# Patient Record
Sex: MALE | Race: WHITE | NOT HISPANIC OR LATINO | Employment: OTHER | URBAN - METROPOLITAN AREA
[De-identification: names, ages, dates, MRNs, and addresses within clinical notes are randomized per-mention and may not be internally consistent; named-entity substitution may affect disease eponyms.]

---

## 2017-01-03 ENCOUNTER — RECORDS - HEALTHEAST (OUTPATIENT)
Dept: LAB | Facility: CLINIC | Age: 67
End: 2017-01-03

## 2017-01-03 LAB
CHOLEST SERPL-MCNC: 132 MG/DL
FASTING STATUS PATIENT QL REPORTED: NO
HDLC SERPL-MCNC: 54 MG/DL
LDLC SERPL CALC-MCNC: 69 MG/DL
TRIGL SERPL-MCNC: 43 MG/DL

## 2017-01-04 LAB — HCV AB SERPL QL IA: NEGATIVE

## 2017-05-03 ENCOUNTER — OFFICE VISIT (OUTPATIENT)
Dept: DERMATOLOGY | Facility: CLINIC | Age: 67
End: 2017-05-03

## 2017-05-03 DIAGNOSIS — L82.1 SK (SEBORRHEIC KERATOSIS): ICD-10-CM

## 2017-05-03 DIAGNOSIS — L81.4 SOLAR LENTIGO: ICD-10-CM

## 2017-05-03 DIAGNOSIS — Z85.828 HISTORY OF NONMELANOMA SKIN CANCER: ICD-10-CM

## 2017-05-03 DIAGNOSIS — D18.01 CHERRY ANGIOMA: ICD-10-CM

## 2017-05-03 DIAGNOSIS — L21.9 DERMATITIS, SEBORRHEIC: Primary | ICD-10-CM

## 2017-05-03 DIAGNOSIS — Z12.83 SCREENING EXAM FOR SKIN CANCER: ICD-10-CM

## 2017-05-03 RX ORDER — MULTIVITAMIN
1 CAPSULE ORAL
COMMUNITY
Start: 2010-11-23

## 2017-05-03 RX ORDER — ATORVASTATIN CALCIUM 20 MG/1
20 TABLET, FILM COATED ORAL
COMMUNITY
Start: 2016-10-10 | End: 2019-10-01

## 2017-05-03 RX ORDER — CLOPIDOGREL BISULFATE 75 MG/1
75 TABLET ORAL
COMMUNITY
Start: 2016-12-19 | End: 2018-02-02

## 2017-05-03 RX ORDER — HYDROCORTISONE 25 MG/G
OINTMENT TOPICAL 2 TIMES DAILY
Qty: 30 G | Refills: 1 | Status: SHIPPED | OUTPATIENT
Start: 2017-05-03 | End: 2018-02-02

## 2017-05-03 RX ORDER — HYDROCHLOROTHIAZIDE 25 MG/1
25 TABLET ORAL
COMMUNITY
Start: 2017-01-19 | End: 2023-07-25

## 2017-05-03 ASSESSMENT — PAIN SCALES - GENERAL: PAINLEVEL: NO PAIN (0)

## 2017-05-03 NOTE — NURSING NOTE
Dermatology Rooming Note    Eliceo Griggs's goals for this visit include:   Chief Complaint   Patient presents with     Derm Problem     Eliceo comes to clinic today for a skin exam. States concern with a new lesion on his left forearm x 1.5 weeks as well as a lesion on his scalp that bleeds.     Eleni Gifford, CMA

## 2017-05-03 NOTE — PROGRESS NOTES
[facility] Dermatology Note    Dermatology Problem List:  1.NMSC   - BCC 02/2016 x3 from right chest, right neck, and mid back s/p ED&C   - BCC 06/2014, right cheek s/p Mohs  2. Hypertrophic scar at prior ED&C site of superficial BCC from mid back s/p biopsy 4/14/16  3. Seborrheic dermatitis  - hydrocortisone 2.5% ointment    Encounter Date: May 3, 2017    CC:   Chief Complaint   Patient presents with     Derm Problem     Eliceo comes to clinic today for a skin exam. States concern with a new lesion on his left forearm x 1.5 weeks as well as a lesion on his scalp that bleeds.     History of Present Illness:  Mr. Eliceo Griggs is a 67 year old male with history of NMSC who presents for a skin check. Today the patient reports that he has two spots of concern on his left forearm and on his scalp. He states that the spot on his forearm appeared 1.5 weeks ago. It does not hurt, itch, and it started out as a bruise. He notes that he is on blood thinners, so he has been bruising very easily. He reports that the spot on his head it is a little sore and it has bleed in the past. It has not bled in about 6 months ago, especially in the summer. He does bump is head a lot and the spot has not grown in size. His face is sometimes a little dry, he was given clobetasol years ago. The patient reports no other lesions of concern at this time.     Otherwise, the patient reports no painful, bleeding, nonhealing, or pruritic lesions, and denies new or changing moles.    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 He went to school to get his masters and finished 2 years ago.     Family History  Not addressed at this visit.     Medications:  Current Outpatient Prescriptions   Medication Sig Dispense Refill     clopidogrel (PLAVIX) 75 MG tablet Take 75 mg by mouth       atorvastatin (LIPITOR) 20 MG tablet Take 20 mg by mouth       hydrochlorothiazide (HYDRODIURIL) 25 MG tablet Take 25 mg by mouth       Multiple Vitamin (MULTIVITAMIN  S) CAPS Take 1 capsule by mouth       aspirin 81 MG tablet Take 81 mg by mouth       hydrocortisone 2.5 % ointment Apply topically 2 times daily To face and ears 30 g 1        Allergies   Allergen Reactions     Penicillins Anaphylaxis     Erythromycin Rash     Sulfa Drugs Hives and Rash     Review of Systems:  -Skin: As above in HPI. No additional skin concerns.    Physical exam:  GEN: Well-developed, well-nourished  male in no acute distress, pleasant, cooperative with exam    SKIN: Total skin excluding the undergarment areas was performed. The exam included the head/face, neck, both arms, chest, back, abdomen, both legs, digits and/or nails.   - Purpuric macule 5 mm in greatest diameter superimposed upon on a 1 mm seborrheic keratosis  - Right frontal scalp: roughly 2 cm relatively ill defined medium pink to light purple patch that is best discerned with tangential dermascopic light. Skin surface is identical to surrounding skin and is not shinier no in focus telangiectasias. Site is not friable based on rubbing test both with finger and alcohol wipes. Areas of concern are 2 small foci at 12 o'clock pole where small whitish crystalline like structures are noted amidst a light pink background, however no classic features of BCC are present, favor scar.   - Many nevi contain interrupted reticular networks suggestive of partial regression  - Well healed biopsy site scar on the right mid back  -  There are bright red some shaped papules scattered on the chest.   - One junctional nevus on the buttocks  - No other lesions of concern on areas examined.     Impression/Plan:  1. History of NMSC  - No evidence of recurrence    2. Purpuric macule superimposed upon a seborrheic keratosis  - No further intervention required. Patient to report changes.     3. Favor scar - right frontal scalp  - Photo obtained at today's visit.   - Monitor lesion for changes, consider biopsy if lesion changes    4. Multiple clinically benign nevi - trunk, extremities  - No further intervention required. Patient to report changes.     5. Cherry angioma(s) - chest  - No further intervention required. Patient to report changes.     6. Seborrheic dermatitis  - Start hydrocortisone 2.5% ointment - apply to face and ears BID    Follow-up in 1 year, earlier for new or changing lesions.    Staff Involved:  Scribe Disclosure:   I, Saba Belle, am serving as a scribe to document services personally performed by Dr. Elvin Delvalle, based on data collection and the provider's statements to me.     Staff attestation:  The documentation recorded by the scribe accurately reflects the services I personally performed and the decisions I personally made.    Elvin Delvalle MD  Staff Dermatologist    Department of Dermatology

## 2017-05-03 NOTE — MR AVS SNAPSHOT
After Visit Summary   5/3/2017    Eliceo Griggs    MRN: [id number]           Patient Information     Date Of Birth          1950        Visit Information        Provider Department      5/3/2017 12:30 PM Elvin Delvalle MD [facility] Dermatology        Today's Diagnoses     Dermatitis, seborrheic    -  1    History of nonmelanoma skin cancer        SK (seborrheic keratosis)        Cherry angioma        Solar lentigo        Screening exam for skin cancer           Follow-ups after your visit        Who to contact     Please call your clinic at [phone] to:    Ask questions about your health    Make or cancel appointments    Discuss your medicines    Learn about your test results    Speak to your doctor   If you have compliments or concerns about an experience at your clinic, or if you wish to file a complaint, please contact [facility] Patient Relations at [phone] or email us at [email or web address]         Additional Information About Your Visit        MyChart Information     Ion Coret gives you secure access to your electronic health record. If you see a primary care provider, you can also send messages to your care team and make appointments. If you have questions, please call your primary care clinic.  If you do not have a primary care provider, please call [phone] and they will assist you.      [employer] is an electronic gateway that provides easy, online access to your medical records. With [employer], you can request a clinic appointment, read your test results, renew a prescription or communicate with your care team.     To access your existing account, please contact your [facility] or call [phone] for assistance.        Care EveryWhere ID     This is your Care EveryWhere ID. This could be used by other organizations to access your [place] medical records  [id number]         Blood Pressure from Last 3  Encounters:   No data found for BP    Weight from Last 3 Encounters:   No data found for Wt              Today, you had the following     No orders found for display         Today's Medication Changes          These changes are accurate as of: 5/3/17 11:59 PM.  If you have any questions, ask your nurse or doctor.               Start taking these medicines.        Dose/Directions    hydrocortisone 2.5 % ointment   Used for:  Dermatitis, seborrheic   Started by:  Elvin Delvalle MD        Apply topically 2 times daily To face and ears   Quantity:  30 g   Refills:  1         These medicines have changed or have updated prescriptions.        Dose/Directions    aspirin 81 MG tablet   This may have changed:  Another medication with the same name was removed. Continue taking this medication, and follow the directions you see here.   Changed by:  Elvin Delvalle MD        Dose:  81 mg   Take 81 mg by mouth   Refills:  0         Stop taking these medicines if you haven't already. Please contact your care team if you have questions.     atenolol 25 MG tablet   Commonly known as:  TENORMIN   Stopped by:  Elvin Delvalle MD           clobetasol 0.05 % ointment   Commonly known as:  TEMOVATE   Stopped by:  Elvin Delvalle MD           MULTIVITAMIN PO   Stopped by:  Elvin Delvalle MD                Where to get your medicines      These medications were sent to [facility] [address] [place], [place] [address]     Phone:  [phone]     hydrocortisone 2.5 % ointment                Primary Care Provider Office Phone # Fax #    Tolu [place] [phone]       [address] [address]        Thank you!     Thank you for choosing [facility] DERMATOLOG[facility]  for your care. Our goal is always to provide you with excellent care. Hearing back from our patients is one way we can continue to improve our services. Please  take a few minutes to complete the written survey that you may receive in the mail after your visit with us. Thank you!             Your Updated Medication List - Protect others around you: Learn how to safely use, store and throw away your medicines at www.disposemymeds.org.          This list is accurate as of: 5/3/17 11:59 PM.  Always use your most recent med list.                   Brand Name Dispense Instructions for use    aspirin 81 MG tablet      Take 81 mg by mouth       atorvastatin 20 MG tablet    LIPITOR     Take 20 mg by mouth       clopidogrel 75 MG tablet    PLAVIX     Take 75 mg by mouth       hydrochlorothiazide 25 MG tablet    HYDRODIURIL     Take 25 mg by mouth       hydrocortisone 2.5 % ointment     30 g    Apply topically 2 times daily To face and ears       MULTIvitamin  S Caps      Take 1 capsule by mouth

## 2017-05-03 NOTE — LETTER
5/3/2017       RE: Elicoe Griggs  [address] [place] [address]     Dear Colleague,    Thank you for referring your patient, Eliceo Griggs, to the [facility] DERMATOLOG[facility] at [facility]. Please see a copy of my visit note below.    [facility] Dermatology Note    Dermatology Problem List:  1.NMSC   - BCC 02/2016 x3 from right chest, right neck, and mid back s/p ED&C   - BCC 06/2014, right cheek s/p Mohs  2. Hypertrophic scar at prior ED&C site of superficial BCC from mid back s/p biopsy 4/14/16  3. Seborrheic dermatitis  - hydrocortisone 2.5% ointment    Encounter Date: May 3, 2017    CC:   Chief Complaint   Patient presents with     Derm Problem     Eliceo comes to clinic today for a skin exam. States concern with a new lesion on his left forearm x 1.5 weeks as well as a lesion on his scalp that bleeds.     History of Present Illness:  Mr. Eliceo Griggs is a 67 year old male with history of NMSC who presents for a skin check. Today the patient reports that he has two spots of concern on his left forearm and on his scalp. He states that the spot on his forearm appeared 1.5 weeks ago. It does not hurt, itch, and it started out as a bruise. He notes that he is on blood thinners, so he has been bruising very easily. He reports that the spot on his head it is a little sore and it has bleed in the past. It has not bled in about 6 months ago, especially in the summer. He does bump is head a lot and the spot has not grown in size. His face is sometimes a little dry, he was given clobetasol years ago. The patient reports no other lesions of concern at this time.     Otherwise, the patient reports no painful, bleeding, nonhealing, or pruritic lesions, and denies new or changing moles.    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 He went to school to get his masters and finished 2 years ago.     Family History  Not addressed at this visit.     Medications:  Current Outpatient Prescriptions   Medication Sig Dispense Refill     clopidogrel (PLAVIX) 75 MG tablet Take 75 mg by mouth       atorvastatin (LIPITOR) 20 MG tablet Take 20 mg by mouth       hydrochlorothiazide (HYDRODIURIL) 25 MG tablet Take 25 mg by mouth       Multiple Vitamin (MULTIVITAMIN  S) CAPS Take 1 capsule by mouth       aspirin 81 MG tablet Take 81 mg by mouth       hydrocortisone 2.5 % ointment Apply topically 2 times daily To face and ears 30 g 1        Allergies   Allergen Reactions     Penicillins Anaphylaxis     Erythromycin Rash     Sulfa Drugs Hives and Rash     Review of Systems:  -Skin: As above in HPI. No additional skin concerns.    Physical exam:  GEN: Well-developed, well-nourished  male in no acute distress, pleasant, cooperative with exam    SKIN: Total skin excluding the undergarment areas was performed. The exam included the head/face, neck, both arms, chest, back, abdomen, both legs, digits and/or nails.   - Purpuric macule 5 mm in greatest diameter superimposed upon on a 1 mm seborrheic keratosis  - Right frontal scalp: roughly 2 cm relatively ill defined medium pink to light purple patch that is best discerned with tangential dermascopic light. Skin surface is identical to surrounding skin and is not shinier no in focus telangiectasias. Site is not friable based on rubbing test both with finger and alcohol wipes. Areas of concern are 2 small foci at 12 o'clock pole where small  whitish crystalline like structures are noted amidst a light pink background, however no classic features of BCC are present, favor scar.   - Many nevi contain interrupted reticular networks suggestive of partial regression  - Well healed biopsy site scar on the right mid back  - There are bright red some shaped papules scattered on the chest.   - One junctional nevus on the buttocks  - No other lesions of concern on areas examined.     Impression/Plan:  1. History of NMSC  - No evidence of recurrence    2. Purpuric macule superimposed upon a seborrheic keratosis  - No further intervention required. Patient to report changes.     3. Favor scar - right frontal scalp  - Photo obtained at today's visit.   - Monitor lesion for changes, consider biopsy if lesion changes    4. Multiple clinically benign nevi - trunk, extremities  - No further intervention required. Patient to report changes.     5. Cherry angioma(s) - chest  - No further intervention required. Patient to report changes.     6. Seborrheic dermatitis  - Start hydrocortisone 2.5% ointment - apply to face and ears BID       Follow-up in 1 year, earlier for new or changing lesions.    Staff Involved:  Scribe Disclosure:   I, Saba Belle, am serving as a scribe to document services personally performed by Dr. Elvin Delvalle, based on data collection and the provider's statements to me.     Again, thank you for allowing me to participate in the care of your patient.      Sincerely,    Elvin Delvalle MD

## 2017-07-13 ENCOUNTER — RECORDS - HEALTHEAST (OUTPATIENT)
Dept: LAB | Facility: CLINIC | Age: 67
End: 2017-07-13

## 2017-07-13 LAB
CHOLEST SERPL-MCNC: 129 MG/DL
FASTING STATUS PATIENT QL REPORTED: NORMAL
HDLC SERPL-MCNC: 58 MG/DL
LDLC SERPL CALC-MCNC: 63 MG/DL
TRIGL SERPL-MCNC: 42 MG/DL

## 2018-01-17 ENCOUNTER — TRANSFERRED RECORDS (OUTPATIENT)
Dept: HEALTH INFORMATION MANAGEMENT | Facility: CLINIC | Age: 68
End: 2018-01-17

## 2018-01-29 PROCEDURE — 00000346 ZZHCL STATISTIC REVIEW OUTSIDE SLIDES TC 88321: Performed by: UROLOGY

## 2018-01-30 LAB — COPATH REPORT: NORMAL

## 2018-01-31 ENCOUNTER — RECORDS - HEALTHEAST (OUTPATIENT)
Dept: LAB | Facility: CLINIC | Age: 68
End: 2018-01-31

## 2018-01-31 LAB
ANION GAP SERPL CALCULATED.3IONS-SCNC: 10 MMOL/L (ref 5–18)
BUN SERPL-MCNC: 30 MG/DL (ref 8–22)
CALCIUM SERPL-MCNC: 9.4 MG/DL (ref 8.5–10.5)
CHLORIDE BLD-SCNC: 107 MMOL/L (ref 98–107)
CO2 SERPL-SCNC: 25 MMOL/L (ref 22–31)
CREAT SERPL-MCNC: 0.82 MG/DL (ref 0.7–1.3)
GFR SERPL CREATININE-BSD FRML MDRD: >60 ML/MIN/1.73M2
GLUCOSE BLD-MCNC: 103 MG/DL (ref 70–125)
POTASSIUM BLD-SCNC: 3.9 MMOL/L (ref 3.5–5)
SODIUM SERPL-SCNC: 142 MMOL/L (ref 136–145)

## 2018-02-02 ENCOUNTER — OFFICE VISIT (OUTPATIENT)
Dept: UROLOGY | Facility: CLINIC | Age: 68
End: 2018-02-02
Payer: COMMERCIAL

## 2018-02-02 VITALS
HEIGHT: 71 IN | SYSTOLIC BLOOD PRESSURE: 138 MMHG | DIASTOLIC BLOOD PRESSURE: 86 MMHG | WEIGHT: 174 LBS | BODY MASS INDEX: 24.36 KG/M2 | HEART RATE: 70 BPM

## 2018-02-02 DIAGNOSIS — C61 PROSTATE CANCER (H): Primary | ICD-10-CM

## 2018-02-02 PROCEDURE — 99203 OFFICE O/P NEW LOW 30 MIN: CPT | Performed by: UROLOGY

## 2018-02-02 ASSESSMENT — PAIN SCALES - GENERAL: PAINLEVEL: NO PAIN (0)

## 2018-02-02 NOTE — PROGRESS NOTES
Chief Complaint:   Prostate Cancer         History of Present Illness:   Eliceo Griggs is a very pleasant 67 year old male who presents with a history of prostate cancer. Has previously seen Dr. Dobbs. Elevated PSA led to biopsy in 2014 demonstrating [place] 3+3=6 prostate cancer in 1 core. Has had subsequent biopsies in 2015 and 2016 demonstrating stable disease. PSA has now risen to 9.2 and has MRI suspicious for clinically significant disease. Patient's father  of metastatic prostate cancer. Patient reports he is considering prostatectomy.     MRI  2018  CONCLUSION:  1. Right lateral peripheral zone apical lesion is assigned PI-RADS CATEGORY   4: High. Clinically significant cancer is likely to be present.  2. Midline posterior peripheral zone lesion is assigned PI-RADS CATEGORY 4:   High. Clinically significant cancer is likely to be present. Though this   finding could also reflect a pseudolesion, a focus of clinically   significant malignancy cannot be excluded.    FINAL DIAGNOSIS:   I. CASE FROM [place], [place] ([id number], OBTAINED   14):   A. Prostate, right base, biopsy:   - Benign prostate tissue     B. Prostate, right mid, biopsy:   - Benign prostate tissue     C. Prostate, right apex, biopsy:   - Benign prostate tissue     D. Prostate, left base, biopsy:   - Benign prostate tissue     E. Prostate, left mid, biopsy:   - Benign prostate tissue     F. Prostate, left apex, biopsy:   - Prostatic adenocarcinoma   - Maribel score 6 (3+3)   - Grade Group 1   - Extent: Involves one core (2 mm, 15%)     II. CASE FROM [place], [place] ([id number], OBTAINED   6/29/15):   A. Prostate, left mid, biopsy:   - Atypical small acinar proliferation (ASAP)     B. Prostate, left base, biopsy:   - Benign prostate tissue     C. Prostate, left apex, biopsy:   - Benign prostate tissue     D. Prostate, right mid, biopsy:   - Benign prostate tissue     E. Prostate, right  base, biopsy:   - Benign prostate tissue     F. Prostate, right apex, biopsy:   - Benign prostate tissue     III. CASE FROM [place], [place] ([id number], OBTAINED   6/6/16):   A. Prostate, left mid, biopsy:   - Benign prostate tissue     B. Prostate, left base, biopsy:   - Atypical small acinar proliferation (ASAP), suspicious for Maribel score    6 adenocarcinoma (<5% involvement)     C. Prostate, left apex, biopsy:   - Benign prostate tissue     D. Prostate, right mid, biopsy:   - Benign prostate tissue     E. Prostate, right base, biopsy:   - Benign prostate tissue     F. Prostate, right apex, biopsy:   - Benign prostate tissue            Past Medical History:     Past Medical History:   Diagnosis Date     Basal cell carcinoma      Complication of anesthesia     a long time ago during knee surgery. Patient was nauseous for 3 days after surgery     Other specified malignant neoplasm of skin of trunk, except scrotum      Prostate infection     prostatitis     Squamous cell carcinoma    CAD - stent about 18 months ago         Past Surgical History:     Past Surgical History:   Procedure Laterality Date     BIOPSY OF SKIN LESION       LITHOTRIPSY       MOHS MICROGRAPHIC PROCEDURE       PROSTATE BIOPSY, NEEDLE, SATURATION SAMPLING       TESTICLE SURGERY       VASECTOMY            Medications     Current Outpatient Prescriptions   Medication     atorvastatin (LIPITOR) 20 MG tablet     hydrochlorothiazide (HYDRODIURIL) 25 MG tablet     Multiple Vitamin (MULTIVITAMIN  S) CAPS     aspirin 81 MG tablet     No current facility-administered medications for this visit.           Family History:     Family History   Problem Relation Age of Onset     CANCER Mother      skin cancer     Skin Cancer No family hx of      no skin cancer   Father with prostate cancer         Social History:     Social History     Social History     Marital status:      Spouse name: N/A     Number of children: N/A     Years of  "education: N/A     Occupational History     Not on file.     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         Physical Exam:     Patient is a 67 year old  male   Vitals: Height 1.803 m (5' 11\"), weight 78.9 kg (174 lb).  General Appearance Adult: Body mass index is 24.27 kg/(m^2).  Alert, no acute distress, oriented  HENT: throat/mouth:normal, good dentition  Lungs: no respiratory distress, or pursed lip breathing  Heart: No obvious jugular venous distension present  Abdomen: soft, nontender, no organomegaly or masses,   Lymphatics: No inguinal adenopathy  Musculoskeltal: extremities normal, no peripheral edema  Skin: no suspicious lesions or rashes  Neuro: Alert, oriented, speech and mentation normal  Psych: affect and mood normal  Gait: Normal  : penis, scrotum, testes normal   LAURA anodular, symmetric - ~40cc      Labs and Pathology:    I reviewed all applicable laboratory and pathology data and went over findings with patient  Significant for No results found for: CR      Imaging:    I reviewed all applicable imaging and went over findings with patient.  MRI findings described above.      Outside and Past Medical records:    I spent 10 minutes reviewing outside and past medical records.         Assessment and Plan:     Assessment: 67 year old male with history of low-risk prostate cancer on AS for past 3 years with 3 previous low risk biopsies, last in 6/2016. Now with PSA rise to 9.2 and MRI suggestive of PIRADS 4 lesions. Given this current scenario, my recommendation would be MRI-US fusion " biopsy to better characterize the patient's cancer prior to proceeding with surgery given he has not had a biopsy in 18 months and has PSA rise and new MRI lesions. Furthermore, we discussed the implications of proceeding with surgery and potential risks/complications. I described robotic prostatectomy and the risks/benefits of this vs open prostatectomy. If elects to proceed with MRI Fusion biopsy, I would like to review his MRI and have it read by our radiologists with targets made for Uronav biopsy, which we could do in the coming weeks.     Plan:  Patient to consider options. If he would like to proceed with MRI biopsy, will send us MRI and we will contact him to schedule once this has been reviewed by our radiologists.    Lincoln Hurt MD  Urology  [facility]  Clinic Phone [phone]

## 2018-02-02 NOTE — NURSING NOTE
Chief Complaint   Patient presents with     Prostate Cancer     Patient is here for a second opinion on prostate cancer      Abdulaziz Anderson LPN 11:15 AM February 2, 2018

## 2018-02-02 NOTE — MR AVS SNAPSHOT
"              After Visit Summary   2/2/2018    Eliceo Griggs    MRN: [id number]           Patient Information     Date Of Birth          1950        Visit Information        Provider Department      2/2/2018 11:00 AM Lincoln Hurt MD [facility] [place]        Today's Diagnoses     Prostate cancer (H)    -  1       Follow-ups after your visit        Who to contact     If you have questions or need follow up information about today's clinic visit or your schedule please contact [facility] [place] directly at [phone].  Normal or non-critical lab and imaging results will be communicated to you by OM Latamhart, letter or phone within 4 business days after the clinic has received the results. If you do not hear from us within 7 days, please contact the clinic through nPariot or phone. If you have a critical or abnormal lab result, we will notify you by phone as soon as possible.  Submit refill requests through SmartSynch or call your pharmacy and they will forward the refill request to us. Please allow 3 business days for your refill to be completed.          Additional Information About Your Visit        MyChart Information     SmartSynch gives you secure access to your electronic health record. If you see a primary care provider, you can also send messages to your care team and make appointments. If you have questions, please call your primary care clinic.  If you do not have a primary care provider, please call [phone] and they will assist you.        Care EveryWhere ID     This is your Care EveryWhere ID. This could be used by other organizations to access your [place] medical records  [id number]        Your Vitals Were     Pulse Height BMI (Body Mass Index)             70 1.803 m (5' 11\") 24.27 kg/m2          Blood Pressure from Last 3 Encounters:   02/02/18 138/86   07/24/14 (!) 141/94    Weight from Last 3 Encounters: "   02/02/18 78.9 kg (174 lb)              Today, you had the following     No orders found for display       Primary Care Provider Office Phone # Fax #    Tolu [place] [phone]       [facility] [address] [facility] [address]        Equal Access to Services     VICK GONZALEZ : Hadii aad ku hadasho Soomaali, waaxda luqadaha, qaybta kaalmada adeegyada, waxay idiin hayaan adeeg kharash laronnan . So [facility] [phone].    ATENCIÓN: Si habla español, tiene a see disposición servicios gratuitos de asistencia lingüística. Serena al [phone].    We comply with applicable federal civil rights laws and [place] laws. We do not discriminate on the basis of race, color, national origin, age, disability, sex, sexual orientation, or gender identity.            Thank you!     Thank you for choosing [facility] [place]  for your care. Our goal is always to provide you with excellent care. Hearing back from our patients is one way we can continue to improve our services. Please take a few minutes to complete the written survey that you may receive in the mail after your visit with us. Thank you!             Your Updated Medication List - Protect others around you: Learn how to safely use, store and throw away your medicines at www.disposemymeds.org.          This list is accurate as of 2/2/18 11:59 PM.  Always use your most recent med list.                   Brand Name Dispense Instructions for use Diagnosis    aspirin 81 MG tablet      Take 81 mg by mouth        atorvastatin 20 MG tablet    LIPITOR     Take 20 mg by mouth        hydrochlorothiazide 25 MG tablet    HYDRODIURIL     Take 25 mg by mouth        MULTIvitamin  S Caps      Take 1 capsule by mouth

## 2018-02-06 PROBLEM — C61 PROSTATE CANCER (H): Status: ACTIVE | Noted: 2018-02-06

## 2018-02-09 ENCOUNTER — HOSPITAL ENCOUNTER (INPATIENT)
Dept: GENERAL RADIOLOGY | Facility: CLINIC | Age: 68
End: 2018-02-09
Attending: UROLOGY

## 2018-02-14 ENCOUNTER — TELEPHONE (OUTPATIENT)
Dept: UROLOGY | Facility: CLINIC | Age: 68
End: 2018-02-14

## 2018-02-14 NOTE — TELEPHONE ENCOUNTER
I spoke with patient about his MRI results and options for US-Fusion biopsy. Patient states that he has decided to proceed with prostatectomy as he had originally planned with his primary urologist. He will contact us if he has any additional questions or concerns.    Lincoln Hurt MD  Urology  [facility]  Clinic Phone [phone]

## 2018-12-17 ENCOUNTER — PATIENT OUTREACH (OUTPATIENT)
Dept: CARE COORDINATION | Facility: CLINIC | Age: 68
End: 2018-12-17

## 2019-01-04 ENCOUNTER — OFFICE VISIT (OUTPATIENT)
Dept: DERMATOLOGY | Facility: CLINIC | Age: 69
End: 2019-01-04
Payer: COMMERCIAL

## 2019-01-04 DIAGNOSIS — L57.0 ACTINIC KERATOSIS: ICD-10-CM

## 2019-01-04 DIAGNOSIS — Z85.828 HISTORY OF NONMELANOMA SKIN CANCER: Primary | ICD-10-CM

## 2019-01-04 DIAGNOSIS — L82.1 SEBORRHEIC KERATOSIS: ICD-10-CM

## 2019-01-04 DIAGNOSIS — D48.5 NEOPLASM OF UNCERTAIN BEHAVIOR OF SKIN: ICD-10-CM

## 2019-01-04 DIAGNOSIS — Z86.018 HISTORY OF DYSPLASTIC NEVUS: ICD-10-CM

## 2019-01-04 DIAGNOSIS — D18.01 CHERRY ANGIOMA: ICD-10-CM

## 2019-01-04 ASSESSMENT — PAIN SCALES - GENERAL: PAINLEVEL: NO PAIN (0)

## 2019-01-04 NOTE — PATIENT INSTRUCTIONS
Wound Care After a Biopsy    What is a skin biopsy?  A skin biopsy allows the doctor to examine a very small piece of tissue under the microscope to determine the diagnosis and the best treatment for the skin condition. A local anesthetic (numbing medicine)  is injected with a very small needle into the skin area to be tested. A small piece of skin is taken from the area. Sometimes a suture (stitch) is used.     What are the risks of a skin biopsy?  I will experience scar, bleeding, swelling, pain, crusting and redness. I may experience incomplete removal or recurrence. Risks of this procedure are excessive bleeding, bruising, infection, nerve damage, numbness, thick (hypertrophic or keloidal) scar and non-diagnostic biopsy.    How should I care for my wound for the first 24 hours?    Keep the wound dry and covered for 24 hours    If it bleeds, hold direct pressure on the area for 15 minutes. If bleeding does not stop then go to the emergency room    Avoid strenuous exercise the first 1-2 days or as your doctor instructs you    How should I care for the wound after 24 hours?    After 24 hours, remove the bandage    You may bathe or shower as normal    If you had a scalp biopsy, you can shampoo as usual and can use shower water to clean the biopsy site daily    Clean the wound twice a day with gentle soap and water    Do not scrub, be gentle    Apply white petroleum/Vaseline after cleaning the wound with a cotton swab or a clean finger, and keep the site covered with a Bandaid /bandage. Bandages are not necessary with a scalp biopsy    If you are unable to cover the site with a Bandaid /bandage, re-apply ointment 2-3 times a day to keep the site moist. Moisture will help with healing    Avoid strenuous activity for first 1-2 days    Avoid lakes, rivers, pools, and oceans until the stitches are removed or the site is healed    How do I clean my wound?    Wash hands thoroughly with soap or use hand   before all wound care    Clean the wound with gentle soap and water    Apply white petroleum/Vaseline  to wound after it is clean    Replace the Bandaid /bandage to keep the wound covered for the first few days or as instructed by your doctor    If you had a scalp biopsy, warm shower water to the area on a daily basis should suffice    What should I use to clean my wound?     Cotton-tipped applicators (Qtips )    White petroleum jelly (Vaseline ). Use a clean new container and use Q-tips to apply.    Bandaids   as needed    Gentle soap     How should I care for my wound long term?    Do not get your wound dirty    Keep up with wound care for one week or until the area is healed.    A small scab will form and fall off by itself when the area is completely healed. The area will be red and will become pink in color as it heals. Sun protection is very important for how your scar will turn out. Sunscreen with an SPF 30 or greater is recommended once the area is healed.    If you have stitches, stitches need to be removed in 14 days. You may return to our clinic for this or you may have it done locally at your doctor s office.    You should have some soreness but it should be mild and slowly go away over several days. Talk to your doctor about using tylenol for pain,    When should I call my doctor?  If you have increased:     Pain or swelling    Pus or drainage (clear or slightly yellow drainage is ok)    Temperature over 100F    Spreading redness or warmth around wound    When will I hear about my results?  The biopsy results can take 2-3 weeks to come back. The clinic will call you with the results, send you a Pinstant Karma message, or have you schedule a follow-up clinic or phone time to discuss the results. Contact our clinics if you do not hear from us in 3 weeks.     Who should I call with questions?    [facility]: [phone]     [facility]:  [phone]    For urgent needs outside of business hours call the [facility] at [phone] and ask for the dermatology resident on call    Cryotherapy    What is it?    Use of a very cold liquid, such as liquid nitrogen, to freeze and destroy abnormal skin cells that need to be removed    What should I expect?    Tenderness and redness    A small blister that might grow and fill with dark purple blood. There may be crusting.    More than one treatment may be needed if the lesions do not go away.    How do I care for the treated area?    Gently wash the area with your hands when bathing.    Use a thin layer of Vaseline to help with healing. You may use a Band-Aid.     The area should heal within 7-10 days and may leave behind a pink or lighter color.     Do not use an antibiotic or Neosporin ointment.     You may take acetaminophen (Tylenol) for pain.     Call your Doctor if you have:    Severe pain    Signs of infection (warmth, redness, cloudy yellow drainage, and or a bad smell)    Questions or concerns    Who should I call with questions?       [facility]: [phone]       [facility]: [phone]       For urgent needs outside of business hours call the [facility] at [phone]        and ask for the dermatology resident on call

## 2019-01-04 NOTE — PROGRESS NOTES
"[facility] Dermatology Note      Dermatology Problem List:  0. NUB   - L supraclavicular, r/o superficial spreading BCC vs BLK, s/p bx 01/04/19 results pending   1. NMSC              - BCC 02/2016 x3 from right chest, right neck, and mid back s/p ED&C              - BCC 06/2014, right cheek s/p Mohs  2. Hypertrophic scar at prior ED&C site of superficial BCC from mid back s/p biopsy 4/14/16  3. Seborrheic dermatitis  - Hydrocortisone 2.5% ointment prn   4. AKs  5. Dysplastic nevus   - Right lower back, moderate dysplasia, biopsy 6/10/14, excision 7/24/14  5. **Lesion to monitor   - R upper paraspinal with ~4mm nevus with slightly darker reticular network in areas; photos taken 01/04/19     Encounter Date: Jan 4, 2019    CC:   Chief Complaint   Patient presents with     Skin Check     Eliceo is here today for a skin check. Eliceo notes\" I have a spot on my chest that I would like looked at\"      History of Present Illness:  Mr. Eliceo Griggs is a 68 year old male who with a history of skin cancer and dysplastic nevus presents for a skin check. He hasn't noticed any itching, bleeding, changing spots since he was last seen about 1 year ago. There is a new papule on his R upper chest that is tan that he picks at sometimes. Per chart review he had a likely scar on the R frontal scalp that is being monitored and he denies any changes in this area over time. No family hx of melanoma but his mother has had BCC and SCC's. He is much better about sun protection now and wears hats and SPF 50 nearly every day.     He was also prescribed HTC 2.5% ointment at his last visit to use for seb derm. He uses this a couple times a month for flares. Otherwise denies any skin concerns.     Past Medical History:   Patient Active Problem List   Diagnosis     Dysplastic nevi     Dermatitis, seborrheic     Prostate cancer (H)     Past Medical History:   Diagnosis Date     Basal cell carcinoma      Complication of " anesthesia     a long time ago during knee surgery. Patient was nauseous for 3 days after surgery     Other specified malignant neoplasm of skin of trunk, except scrotum      Prostate infection     prostatitis     Squamous cell carcinoma      Past Surgical History:   Procedure Laterality Date     BIOPSY OF SKIN LESION       LITHOTRIPSY       MOHS MICROGRAPHIC PROCEDURE       PROSTATE BIOPSY, NEEDLE, SATURATION SAMPLING       TESTICLE SURGERY       VASECTOMY         Social History:  Patient reports that  has never smoked. he has never used smokeless tobacco.    Family History:  Family History   Problem Relation Age of Onset     Cancer Mother         skin cancer     Skin Cancer No family hx of         no skin cancer       Medications:  Current Outpatient Medications   Medication Sig Dispense Refill     aspirin 81 MG tablet Take 81 mg by mouth       atorvastatin (LIPITOR) 20 MG tablet Take 20 mg by mouth       hydrochlorothiazide (HYDRODIURIL) 25 MG tablet Take 25 mg by mouth       Multiple Vitamin (MULTIVITAMIN  S) CAPS Take 1 capsule by mouth          Allergies   Allergen Reactions     Penicillins Anaphylaxis     Erythromycin Rash     Sulfa Drugs Hives and Rash         Review of Systems:  -Constitutional:  Feeling well, in usual state of health.  -Skin:  As per HPI, no additional concerns.      Physical exam:  Vitals: There were no vitals taken for this visit.  GEN: This is a well developed, well-nourished male in no acute distress, in a pleasant mood.    SKIN: Full skin, which includes the head/face, both arms, chest, back, abdomen,both legs, genitalia and/or groin buttocks, digits and/or nails, was examined.  -There are erythematous macules with overyling adherent gritty scale on the scalp.   - R frontal scalp w/out concerning lesion today   - L supraclavicular region with an erythematous erosion with small blood vessels within; inferior border slightly sclerotic    - Multiple regular brown pigmented macules and  papules are identified on the trunk; many with an interruppted reticular pigment network.   - R upper paraspinal with ~4mm nevus with slightly darker reticular network in areas; photos taken as below  -There is a waxy stuck on tan to brown papule on the R upper chest.  - Well healed bx site scar on R mid back   - Multiple bright red papules on the chest   - No other lesions of concern on areas examined.                       Impression/Plan:  1. NUB  - L supraclavicular region, r/o superficial spreading BCC vs BLK, s/p bx 01/04/19, results pending   - Shave biopsy:  After discussion of benefits and risks including but not limited to bleeding/bruising, pain/swelling, infection, scar, incomplete removal, nerve damage/numbness, recurrence, and non-diagnostic biopsy, written consent, verbal consent and photographs were obtained. Time-out was performed. The area was cleaned with isopropyl alcohol. 0.5mL of 1% lidocaine with epinephrine was injected to obtain adequate anesthesia of the lesion on the L supraclavicular region. A shave biopsy was performed. Hemostasis was achieved with aluminium chloride. Vaseline and a sterile dressing were applied. The patient tolerated the procedure and no complications were noted. The patient was provided with verbal and written post care instructions.     2. Actinic keratoses   - Cryotherapy procedure note: After verbal consent and discussion of risks and benefits including but no limited to dyspigmentation/scar, blister, and pain, 1 AK was(were) treated with 1-2mm freeze border for 2 cycles with liquid nitrogen. Post cryotherapy instructions were provided.   - Discussed potential pre-cancerous nature of the lesion with the patient; encouraged continued good sun protective practices     3. History of nonmelanoma skin cancer, no clincial evidence of recurrence=    Sun precaution was advised including the use of sun screens of SPF 30 or higher, sun protective clothing, and avoidance of  tanning beds.    4. Seborrheic keratosis, non irritated    No further intervention required. Discussed benign nature of lesions.    5. Cherry angioma(s)    Reassured patient of benign etiology.     6. Seborrheic dermatitis    Continue HTC 2.5% ointment prn    Discussed potential long term adverse effects of topical steroid use; patient only using sparingly at this time    7. **Lesion to monitor   - R upper paraspinal with ~4mm nevus with slightly darker reticular network in areas; photos taken 01/04/19     CC Referred Self, MD  No address on file on close of this encounter.  Follow-up in 1 year, earlier for new or changing lesions.       Dr. Aguilar staffed the patient.    Staff Involved:  Resident(Lexie)/Staff    Staff Physician Comments:   I saw and evaluated the patient with the resident and I agree with the assessment and plan. I was present for the entire minor procedure (cryotherapy) and key portions of the above major procedure (biopsy) and examination.    Donna Aguilar MD    Department of Dermatology  [facility]: Phone: [phone], Fax:[phone]  [facility]: Phone: [phone], Fax: [phone]

## 2019-01-04 NOTE — LETTER
"1/4/2019       RE: Eliceo Griggs  [address] [place] [address]     Dear Colleague,    Thank you for referring your patient, Eliceo Griggs, to the [facility] DERMATOLOGY at [facility]. Please see a copy of my visit note below.        [facility]      Dermatology Problem List:  0. NUB   - L supraclavicular, r/o superficial spreading BCC vs BLK, s/p bx 01/04/19 results pending   1. NMSC              - BCC 02/2016 x3 from right chest, right neck, and mid back s/p ED&C              - BCC 06/2014, right cheek s/p Mohs  2. Hypertrophic scar at prior ED&C site of superficial BCC from mid back s/p biopsy 4/14/16  3. Seborrheic dermatitis  - Hydrocortisone 2.5% ointment prn   4. AKs  5. Dysplastic nevus   - Right lower back, moderate dysplasia, biopsy 6/10/14, excision 7/24/14  5. **Lesion to monitor   - R upper paraspinal with ~4mm nevus with slightly darker reticular network in areas; photos taken 01/04/19     Encounter Date: Jan 4, 2019    CC:   Chief Complaint   Patient presents with     Skin Check     Eliceo is here today for a skin check. Eliceo notes\" I have a spot on my chest that I would like looked at\"      History of Present Illness:  Mr. Eliceo Griggs is a 68 year old male who with a history of skin cancer and dysplastic nevus presents for a skin check. He hasn't noticed any itching, bleeding, changing spots since he was last seen about 1 year ago. There is a new papule on his R upper chest that is tan that he picks at sometimes. Per chart review he had a likely scar on the R frontal scalp that is being monitored and he denies any changes in this area over time. No family hx of melanoma but his mother has had BCC and SCC's. He is much better about sun protection now and wears hats and SPF 50 nearly every day.     He was also prescribed HTC 2.5% ointment at his last visit to use for seb derm. He uses this a couple times a " month for flares. Otherwise denies any skin concerns.     Past Medical History:   Patient Active Problem List   Diagnosis     Dysplastic nevi     Dermatitis, seborrheic     Prostate cancer (H)     Past Medical History:   Diagnosis Date     Basal cell carcinoma      Complication of anesthesia     a long time ago during knee surgery. Patient was nauseous for 3 days after surgery     Other specified malignant neoplasm of skin of trunk, except scrotum      Prostate infection     prostatitis     Squamous cell carcinoma      Past Surgical History:   Procedure Laterality Date     BIOPSY OF SKIN LESION       LITHOTRIPSY       MOHS MICROGRAPHIC PROCEDURE       PROSTATE BIOPSY, NEEDLE, SATURATION SAMPLING       TESTICLE SURGERY       VASECTOMY         Social History:  Patient reports that  has never smoked. he has never used smokeless tobacco.    Family History:  Family History   Problem Relation Age of Onset     Cancer Mother         skin cancer     Skin Cancer No family hx of         no skin cancer       Medications:  Current Outpatient Medications   Medication Sig Dispense Refill     aspirin 81 MG tablet Take 81 mg by mouth       atorvastatin (LIPITOR) 20 MG tablet Take 20 mg by mouth       hydrochlorothiazide (HYDRODIURIL) 25 MG tablet Take 25 mg by mouth       Multiple Vitamin (MULTIVITAMIN  S) CAPS Take 1 capsule by mouth          Allergies   Allergen Reactions     Penicillins Anaphylaxis     Erythromycin Rash     Sulfa Drugs Hives and Rash         Review of Systems:  -Constitutional:  Feeling well, in usual state of health.  -Skin:  As per HPI, no additional concerns.      Physical exam:  Vitals: There were no vitals taken for this visit.  GEN: This is a well developed, well-nourished male in no acute distress, in a pleasant mood.    SKIN: Full skin, which includes the head/face, both arms, chest, back, abdomen,both legs, genitalia and/or groin buttocks, digits and/or nails, was examined.  -There are erythematous  macules with overyling adherent gritty scale on the scalp.   - R frontal scalp w/out concerning lesion today   - L supraclavicular region with an erythematous erosion with small blood vessels within; inferior border slightly sclerotic    - Multiple regular brown pigmented macules and papules are identified on the trunk; many with an interruppted reticular pigment network.   - R upper paraspinal with ~4mm nevus with slightly darker reticular network in areas; photos taken as below  -There is a waxy stuck on tan to brown papule on the R upper chest.  - Well healed bx site scar on R mid back   - Multiple bright red papules on the chest   - No other lesions of concern on areas examined.                       Impression/Plan:  1. NUB  - L supraclavicular region, r/o superficial spreading BCC vs BLK, s/p bx 01/04/19, results pending   - Shave biopsy:  After discussion of benefits and risks including but not limited to bleeding/bruising, pain/swelling, infection, scar, incomplete removal, nerve damage/numbness, recurrence, and non-diagnostic biopsy, written consent, verbal consent and photographs were obtained. Time-out was performed. The area was cleaned with isopropyl alcohol. 0.5mL of 1% lidocaine with epinephrine was injected to obtain adequate anesthesia of the lesion on the L supraclavicular region. A shave biopsy was performed. Hemostasis was achieved with aluminium chloride. Vaseline and a sterile dressing were applied. The patient tolerated the procedure and no complications were noted. The patient was provided with verbal and written post care instructions.     2. Actinic keratoses   - Cryotherapy procedure note: After verbal consent and discussion of risks and benefits including but no limited to dyspigmentation/scar, blister, and pain, 1 AK was(were) treated with 1-2mm freeze border for 2 cycles with liquid nitrogen. Post cryotherapy instructions were provided.   - Discussed potential pre-cancerous nature of  the lesion with the patient; encouraged continued good sun protective practices     3. History of nonmelanoma skin cancer, no clincial evidence of recurrence=    Sun precaution was advised including the use of sun screens of SPF 30 or higher, sun protective clothing, and avoidance of tanning beds.    4. Seborrheic keratosis, non irritated    No further intervention required. Discussed benign nature of lesions.    5. Cherry angioma(s)    Reassured patient of benign etiology.     6. Seborrheic dermatitis    Continue HTC 2.5% ointment prn    Discussed potential long term adverse effects of topical steroid use; patient only using sparingly at this time    7. **Lesion to monitor   - R upper paraspinal with ~4mm nevus with slightly darker reticular network in areas; photos taken 01/04/19     CC Referred Self, MD  No address on file on close of this encounter.  Follow-up in 1 year, earlier for new or changing lesions.       Dr. Aguilar staffed the patient.    Staff Involved:  Resident(Lexie)/Staff    Staff Physician Comments:   I saw and evaluated the patient with the resident and I agree with the assessment and plan. I was present for the entire minor procedure (cryotherapy) and key portions of the above major procedure (biopsy) and examination.    Donna Aguilar MD    Department of Dermatology  [facility]: Phone: [phone], Fax:[phone]  [facility]: Phone: [phone], Fax: [phone]

## 2019-01-04 NOTE — NURSING NOTE
"Dermatology Rooming Note    Eliceo Griggs's goals for this visit include:   Chief Complaint   Patient presents with     Skin Check     Eliceo is here today for a skin check. Eliceo notes\" I have a spot on my chest that I would like looked at\"      Nohemi Price, [id number]    "

## 2019-01-07 LAB — COPATH REPORT: NORMAL

## 2019-01-14 ENCOUNTER — TELEPHONE (OUTPATIENT)
Dept: DERMATOLOGY | Facility: CLINIC | Age: 69
End: 2019-01-14

## 2019-01-14 DIAGNOSIS — C44.519 BCC (BASAL CELL CARCINOMA), TRUNK: Primary | ICD-10-CM

## 2019-01-14 RX ORDER — IMIQUIMOD 12.5 MG/.25G
CREAM TOPICAL DAILY
Qty: 24 PACKET | Refills: 0 | Status: SHIPPED | OUTPATIENT
Start: 2019-01-14 | End: 2019-02-25

## 2019-01-14 NOTE — TELEPHONE ENCOUNTER
[facility] Call Center    Phone Message    May a detailed message be left on voicemail: yes    Reason for Call: Other: Pt calling back to let nurse know his option he chooses is to try the cream, please call to discuss how to get this started     Action Taken: Message routed to:  [facility] ([facility]): Dermatology Clinic

## 2019-01-14 NOTE — TELEPHONE ENCOUNTER
Received request for prescription for imiquimod 5% cream daily for 6 weeks for treatment of BCC as the resident on call. Reviewed patient's chart and attached communication. Patient last seen 1/4/19 for ski check at which time he had a biopsy of the left supraclavicular area which showed a superficial BCC. Various treatment options including excision and ED&C were also discussed with patient and he opts to use imiquimod. RTC 1 year. After reviewing the medication list and assessment and plan from last visit, the request was accepted.    The patient's information will be forwarded to the scheduling pool for return visit as planned at prior visit.    Routed as JUAN LUIS Smith M.D.  Dermatology, PGY-3  [facility]  Pager [phone]

## 2019-01-16 ENCOUNTER — RECORDS - HEALTHEAST (OUTPATIENT)
Dept: LAB | Facility: CLINIC | Age: 69
End: 2019-01-16

## 2019-01-16 LAB
ALBUMIN SERPL-MCNC: 4 G/DL (ref 3.5–5)
ALP SERPL-CCNC: 82 U/L (ref 45–120)
ALT SERPL W P-5'-P-CCNC: 41 U/L (ref 0–45)
ANION GAP SERPL CALCULATED.3IONS-SCNC: 11 MMOL/L (ref 5–18)
AST SERPL W P-5'-P-CCNC: 36 U/L (ref 0–40)
BASOPHILS # BLD AUTO: 0 THOU/UL (ref 0–0.2)
BASOPHILS NFR BLD AUTO: 1 % (ref 0–2)
BILIRUB SERPL-MCNC: 0.8 MG/DL (ref 0–1)
BUN SERPL-MCNC: 29 MG/DL (ref 8–22)
CALCIUM SERPL-MCNC: 10 MG/DL (ref 8.5–10.5)
CHLORIDE BLD-SCNC: 107 MMOL/L (ref 98–107)
CHOLEST SERPL-MCNC: 123 MG/DL
CO2 SERPL-SCNC: 25 MMOL/L (ref 22–31)
CREAT SERPL-MCNC: 0.91 MG/DL (ref 0.7–1.3)
EOSINOPHIL # BLD AUTO: 0.2 THOU/UL (ref 0–0.4)
EOSINOPHIL NFR BLD AUTO: 3 % (ref 0–6)
ERYTHROCYTE [DISTWIDTH] IN BLOOD BY AUTOMATED COUNT: 13.2 % (ref 11–14.5)
FASTING STATUS PATIENT QL REPORTED: YES
GFR SERPL CREATININE-BSD FRML MDRD: >60 ML/MIN/1.73M2
GLUCOSE BLD-MCNC: 101 MG/DL (ref 70–125)
HCT VFR BLD AUTO: 45.4 % (ref 40–54)
HDLC SERPL-MCNC: 67 MG/DL
HGB BLD-MCNC: 14.9 G/DL (ref 14–18)
LDLC SERPL CALC-MCNC: 49 MG/DL
LYMPHOCYTES # BLD AUTO: 1.5 THOU/UL (ref 0.8–4.4)
LYMPHOCYTES NFR BLD AUTO: 26 % (ref 20–40)
MCH RBC QN AUTO: 30.8 PG (ref 27–34)
MCHC RBC AUTO-ENTMCNC: 32.8 G/DL (ref 32–36)
MCV RBC AUTO: 94 FL (ref 80–100)
MONOCYTES # BLD AUTO: 0.7 THOU/UL (ref 0–0.9)
MONOCYTES NFR BLD AUTO: 11 % (ref 2–10)
NEUTROPHILS # BLD AUTO: 3.4 THOU/UL (ref 2–7.7)
NEUTROPHILS NFR BLD AUTO: 59 % (ref 50–70)
PLATELET # BLD AUTO: 236 THOU/UL (ref 140–440)
PMV BLD AUTO: 10.9 FL (ref 8.5–12.5)
POTASSIUM BLD-SCNC: 4.2 MMOL/L (ref 3.5–5)
PROT SERPL-MCNC: 6.9 G/DL (ref 6–8)
PSA SERPL-MCNC: <0.1 NG/ML (ref 0–4.5)
RBC # BLD AUTO: 4.84 MILL/UL (ref 4.4–6.2)
SODIUM SERPL-SCNC: 143 MMOL/L (ref 136–145)
TRIGL SERPL-MCNC: 35 MG/DL
WBC: 5.7 THOU/UL (ref 4–11)

## 2019-01-17 ENCOUNTER — TELEPHONE (OUTPATIENT)
Dept: DERMATOLOGY | Facility: CLINIC | Age: 69
End: 2019-01-17

## 2019-01-17 NOTE — TELEPHONE ENCOUNTER
[facility] Call Center    Phone Message    May a detailed message be left on voicemail: yes    Reason for Call: Other: PT is returning a call to Simran regarding follow up from a biopsy.  Please reach out to the PT at number above.     Action Taken: Message routed to:  [facility] derm

## 2019-01-17 NOTE — TELEPHONE ENCOUNTER
Called patient and gathered information. As he discussed with Dr. Aguilar, he wants to treat his BCC with the cream as directed. I notified him that his prescription has been sent to his pharmacy. He had no more questions.

## 2019-01-31 ENCOUNTER — TELEPHONE (OUTPATIENT)
Dept: DERMATOLOGY | Facility: CLINIC | Age: 69
End: 2019-01-31

## 2019-01-31 NOTE — TELEPHONE ENCOUNTER
[facility] Call Center    Phone Message    May a detailed message be left on voicemail: yes    Reason for Call: Other: The pt has been using a cream to treat a lesion, and now the area is so red, he con't tell where the lesion is and where his skin is. Please call to discuss. Thanks.     Action Taken: Message routed to:  [facility] dermat

## 2019-01-31 NOTE — TELEPHONE ENCOUNTER
I called and spoke with Eliceo and explained the normal side effects of the imiquimad - redness, irritation, itch.  Eliceo was happy with the call back and will continue the treatment.

## 2019-06-24 ENCOUNTER — OFFICE VISIT (OUTPATIENT)
Dept: DERMATOLOGY | Facility: CLINIC | Age: 69
End: 2019-06-24
Payer: COMMERCIAL

## 2019-06-24 VITALS — SYSTOLIC BLOOD PRESSURE: 131 MMHG | HEART RATE: 87 BPM | DIASTOLIC BLOOD PRESSURE: 89 MMHG

## 2019-06-24 DIAGNOSIS — L57.0 ACTINIC KERATOSIS: ICD-10-CM

## 2019-06-24 DIAGNOSIS — L82.0 INFLAMED SEBORRHEIC KERATOSIS: ICD-10-CM

## 2019-06-24 DIAGNOSIS — C44.519 BASAL CELL CARCINOMA OF CLAVICULAR AREA: Primary | ICD-10-CM

## 2019-06-24 RX ORDER — ROSUVASTATIN CALCIUM 20 MG/1
20 TABLET, COATED ORAL
COMMUNITY
Start: 2019-02-20 | End: 2023-07-25

## 2019-06-24 ASSESSMENT — PAIN SCALES - GENERAL: PAINLEVEL: NO PAIN (0)

## 2019-06-24 NOTE — NURSING NOTE
Dermatology Rooming Note    Eliceo Griggs's goals for this visit include:   Chief Complaint   Patient presents with     Derm Problem     Eliceo is here today for a skin lesion on his left side of head. Would also like a lesion on his left upper chest looked at.     Vania Hoover, [facility]

## 2019-06-24 NOTE — PROGRESS NOTES
[facility] Dermatology Note      Dermatology Problem List:  1. NMSC   - BCC 01/2019 L clavicle- s/p imiquimod              - BCC 02/2016 x3 from right chest, right neck, and mid back s/p ED&C              - BCC 06/2014, right cheek s/p Mohs  2. Hypertrophic scar at prior ED&C site of superficial BCC from mid back s/p biopsy 4/14/16  3. Seborrheic dermatitis  - Hydrocortisone 2.5% ointment prn   4. AKs  5. Dysplastic nevus   - Right lower back, moderate dysplasia, biopsy 6/10/14, excision 7/24/14  5. Lesion to monitor   - R upper paraspinal with ~4mm nevus with slightly darker reticular network in areas; photos taken 01/04/19     Encounter Date: Jun 24, 2019    CC:   Chief Complaint   Patient presents with     Derm Problem     Eliceo is here today for a skin lesion on his left side of head. Would also like a lesion on his left upper chest looked at.     History of Present Illness:  Mr. Eliceo Griggs is a 69 year old male who with a history of skin cancer and dysplastic nevus presents for a skin check. Last seen 1/4/2019. No family hx of melanoma but his mother has had BCCs and SCCs. He is much better about sun protection now and wears hats and SPF 50 nearly every day.     There is a site on his left upper chest that he would like evaluated where a BCC was previously treated with imiquimod for 120 days earlier this year. He notes a residual pink plaque in this area, but no longer any overt lesion. He also notes a brown raised scaly bump on the left temporal scalp. It is itchy and has been present for a few months.     Past Medical History:   Patient Active Problem List   Diagnosis     Dysplastic nevi     Dermatitis, seborrheic     Prostate cancer (H)     Past Medical History:   Diagnosis Date     Basal cell carcinoma      Complication of anesthesia     a long time ago during knee surgery. Patient was nauseous for 3 days after surgery     Other specified malignant neoplasm of skin of trunk, except  scrotum      Prostate infection     prostatitis     Squamous cell carcinoma      Past Surgical History:   Procedure Laterality Date     BIOPSY OF SKIN LESION       LITHOTRIPSY       MOHS MICROGRAPHIC PROCEDURE       PROSTATE BIOPSY, NEEDLE, SATURATION SAMPLING       TESTICLE SURGERY       VASECTOMY         Social History:  Patient reports that he has never smoked. He has never used smokeless tobacco.    Family History:  Family History   Problem Relation Age of Onset     Cancer Mother         skin cancer     Skin Cancer No family hx of         no skin cancer       Medications:  Current Outpatient Medications   Medication Sig Dispense Refill     aspirin 81 MG tablet Take 81 mg by mouth       hydrochlorothiazide (HYDRODIURIL) 25 MG tablet Take 25 mg by mouth       Multiple Vitamin (MULTIVITAMIN  S) CAPS Take 1 capsule by mouth       rosuvastatin (CRESTOR) 20 MG tablet Take 20 mg by mouth       atorvastatin (LIPITOR) 20 MG tablet Take 20 mg by mouth          Allergies   Allergen Reactions     Penicillins Anaphylaxis     Erythromycin Rash     Sulfa Drugs Hives and Rash         Review of Systems:  -Constitutional:  Feeling well, in usual state of health.  -Skin:  As per HPI, no additional concerns.      Physical exam:  Vitals: /89 (BP Location: Left arm, Patient Position: Sitting, Cuff Size: Adult Regular)   Pulse 87   GEN: This is a well developed, well-nourished male in no acute distress, in a pleasant mood.    SKIN: Waist-up skin, which includes the head/face, neck, both arms, chest, back, abdomen, digits and/or nails was examined.   -There are 2 erythematous macules with overyling adherent gritty scale on the scalp and right preauricular cheek.   -There is a waxy stuck on tan to brown papule with surrounding erythema on the L temporal scalp  -Pink slightly atrophic patch overlying the L clavicle  -No other lesions of concern on areas examined.         Impression/Plan:  1. BCC on the L clavicle: s/p treatment  with imiquimod with no active lesion on exam today    Will continue to clinically monitor this lesion closely with low threshold to biopsy if there are any signs of recurrence    2. Actinic keratoses on the R preauricular cheek and scalp    Cryotherapy procedure note: After verbal consent and discussion of risks and benefits including but no limited to dyspigmentation/scar, blister, and pain, 2 was(were) treated with 1-2mm freeze border for 2 cycles with liquid nitrogen. Post cryotherapy instructions were provided.    Discussed potential pre-cancerous nature of the lesion with the patient; encouraged continued good sun protective practices     3. Seborrheic keratosis, inflamed on the L temporal scalp    Cryotherapy procedure note: After verbal consent and discussion of risks and benefits including but no limited to dyspigmentation/scar, blister, and pain, 1 was(were) treated with 1-2mm freeze border for 2 cycles with liquid nitrogen. Post cryotherapy instructions were provided.        Follow-up in 3-4 months, earlier for new or changing lesions.       Staff Involved:  Scribe/Staff    Scribe Disclosure  I, Dominic Najjar, am serving as a scribe to document services personally performed by Dr. Jonh Garcia MD, based on data collection and the provider's statements to me.    Provider Disclosure:   The documentation recorded by the scribe accurately reflects the services I personally performed and the decisions made by me.    Jonh Garcia MD   of Dermatology  Department of Dermatology  [facility]

## 2019-06-24 NOTE — LETTER
6/24/2019       RE: Eliceo Griggs  [address] [place] [address]     Dear Colleague,    Thank you for referring your patient, Eliceo Griggs, to the [facility]MATOLOG[facility]. Please see a copy of my visit note below.    [facility] Dermatology Note      Dermatology Problem List:  1. NMSC   - BCC 01/2019 L clavicle- s/p imiquimod              - BCC 02/2016 x3 from right chest, right neck, and mid back s/p ED&C              - BCC 06/2014, right cheek s/p Mohs  2. Hypertrophic scar at prior ED&C site of superficial BCC from mid back s/p biopsy 4/14/16  3. Seborrheic dermatitis  - Hydrocortisone 2.5% ointment prn   4. AKs  5. Dysplastic nevus   - Right lower back, moderate dysplasia, biopsy 6/10/14, excision 7/24/14  5. Lesion to monitor   - R upper paraspinal with ~4mm nevus with slightly darker reticular network in areas; photos taken 01/04/19     Encounter Date: Jun 24, 2019    CC:   Chief Complaint   Patient presents with     Derm Problem     Eliceo is here today for a skin lesion on his left side of head. Would also like a lesion on his left upper chest looked at.     History of Present Illness:  Mr. Eliceo Griggs is a 69 year old male who with a history of skin cancer and dysplastic nevus presents for a skin check. Last seen 1/4/2019. No family hx of melanoma but his mother has had BCCs and SCCs. He is much better about sun protection now and wears hats and SPF 50 nearly every day.     There is a site on his left upper chest that he would like evaluated where a BCC was previously treated with imiquimod for 120 days earlier this year. He notes a residual pink plaque in this area, but no longer any overt lesion. He also notes a brown raised scaly bump on the left temporal scalp. It is itchy and has been present for a few months.     Past Medical History:   Patient Active Problem List   Diagnosis     Dysplastic nevi     Dermatitis,  seborrheic     Prostate cancer (H)     Past Medical History:   Diagnosis Date     Basal cell carcinoma      Complication of anesthesia     a long time ago during knee surgery. Patient was nauseous for 3 days after surgery     Other specified malignant neoplasm of skin of trunk, except scrotum      Prostate infection     prostatitis     Squamous cell carcinoma      Past Surgical History:   Procedure Laterality Date     BIOPSY OF SKIN LESION       LITHOTRIPSY       MOHS MICROGRAPHIC PROCEDURE       PROSTATE BIOPSY, NEEDLE, SATURATION SAMPLING       TESTICLE SURGERY       VASECTOMY         Social History:  Patient reports that he has never smoked. He has never used smokeless tobacco.    Family History:  Family History   Problem Relation Age of Onset     Cancer Mother         skin cancer     Skin Cancer No family hx of         no skin cancer       Medications:  Current Outpatient Medications   Medication Sig Dispense Refill     aspirin 81 MG tablet Take 81 mg by mouth       hydrochlorothiazide (HYDRODIURIL) 25 MG tablet Take 25 mg by mouth       Multiple Vitamin (MULTIVITAMIN  S) CAPS Take 1 capsule by mouth       rosuvastatin (CRESTOR) 20 MG tablet Take 20 mg by mouth       atorvastatin (LIPITOR) 20 MG tablet Take 20 mg by mouth          Allergies   Allergen Reactions     Penicillins Anaphylaxis     Erythromycin Rash     Sulfa Drugs Hives and Rash         Review of Systems:  -Constitutional:  Feeling well, in usual state of health.  -Skin:  As per HPI, no additional concerns.      Physical exam:  Vitals: /89 (BP Location: Left arm, Patient Position: Sitting, Cuff Size: Adult Regular)   Pulse 87   GEN: This is a well developed, well-nourished male in no acute distress, in a pleasant mood.    SKIN: Waist-up skin, which includes the head/face, neck, both arms, chest, back, abdomen, digits and/or nails was examined.   -There are 2 erythematous macules with overyling adherent gritty scale on the scalp and right  preauricular cheek.   -There is a waxy stuck on tan to brown papule with surrounding erythema on the L temporal scalp  -Pink slightly atrophic patch overlying the L clavicle  -No other lesions of concern on areas examined.         Impression/Plan:  1. BCC on the L clavicle: s/p treatment with imiquimod with no active lesion on exam today    Will continue to clinically monitor this lesion closely with low threshold to biopsy if there are any signs of recurrence    2. Actinic keratoses on the R preauricular cheek and scalp    Cryotherapy procedure note: After verbal consent and discussion of risks and benefits including but no limited to dyspigmentation/scar, blister, and pain, 2 was(were) treated with 1-2mm freeze border for 2 cycles with liquid nitrogen. Post cryotherapy instructions were provided.    Discussed potential pre-cancerous nature of the lesion with the patient; encouraged continued good sun protective practices     3. Seborrheic keratosis, inflamed on the L temporal scalp    Cryotherapy procedure note: After verbal consent and discussion of risks and benefits including but no limited to dyspigmentation/scar, blister, and pain, 1 was(were) treated with 1-2mm freeze border for 2 cycles with liquid nitrogen. Post cryotherapy instructions were provided.        Follow-up in 3-4 months, earlier for new or changing lesions.       Staff Involved:  Scribe/Staff    Scribe Disclosure  I, Dominic Najjar, am serving as a scribe to document services personally performed by Dr. Jonh Garcia MD, based on data collection and the provider's statements to me.    Provider Disclosure:   The documentation recorded by the scribe accurately reflects the services I personally performed and the decisions made by me.    Jonh Garcia MD   of Dermatology  Department of Dermatology  [facility]

## 2019-08-04 ENCOUNTER — TRANSFERRED RECORDS (OUTPATIENT)
Dept: HEALTH INFORMATION MANAGEMENT | Facility: CLINIC | Age: 69
End: 2019-08-04

## 2019-08-17 NOTE — TELEPHONE ENCOUNTER
FUTURE VISIT INFORMATION      FUTURE VISIT INFORMATION:    Date: 9/3/19    Time: 10AM (audio)/ 11AM(ENT)    Location: [facility]  REFERRAL INFORMATION:    Referring provider:  Dr. Tolu Boston    Referring providers clinic:  [facility]    Reason for visit/diagnosis  hearing loss     RECORDS REQUESTED FROM:       Clinic name Comments Records Status Imaging Status   [facility]  7/24/14 notes with Dr Connor Toledo  8/4/19 notes with Dr Ben Fernández  1/18/19 notes with Dr Jonh Lindsay   1/16/19 notes with Dr Tolu Boston   10/5/18 notes with Dr Krystyna Martinez  Sent to scan 8/21 8/17/19 10:38AM sent a fax to [facility] for recs - Amay

## 2019-09-03 ENCOUNTER — PRE VISIT (OUTPATIENT)
Dept: OTOLARYNGOLOGY | Facility: CLINIC | Age: 69
End: 2019-09-03

## 2019-09-30 DIAGNOSIS — H91.90 HEARING LOSS, UNSPECIFIED HEARING LOSS TYPE, UNSPECIFIED LATERALITY: Primary | ICD-10-CM

## 2019-10-01 ENCOUNTER — OFFICE VISIT (OUTPATIENT)
Dept: OTOLARYNGOLOGY | Facility: CLINIC | Age: 69
End: 2019-10-01
Payer: COMMERCIAL

## 2019-10-01 ENCOUNTER — OFFICE VISIT (OUTPATIENT)
Dept: AUDIOLOGY | Facility: CLINIC | Age: 69
End: 2019-10-01
Attending: PHYSICIAN ASSISTANT
Payer: COMMERCIAL

## 2019-10-01 VITALS
HEIGHT: 71 IN | BODY MASS INDEX: 26.39 KG/M2 | SYSTOLIC BLOOD PRESSURE: 165 MMHG | DIASTOLIC BLOOD PRESSURE: 83 MMHG | HEART RATE: 72 BPM | WEIGHT: 188.5 LBS

## 2019-10-01 DIAGNOSIS — H90.3 SENSORY HEARING LOSS, BILATERAL: Primary | ICD-10-CM

## 2019-10-01 DIAGNOSIS — H69.93 DYSFUNCTION OF BOTH EUSTACHIAN TUBES: Primary | ICD-10-CM

## 2019-10-01 DIAGNOSIS — J30.2 SEASONAL ALLERGIC RHINITIS, UNSPECIFIED TRIGGER: ICD-10-CM

## 2019-10-01 DIAGNOSIS — H90.3 ASYMMETRICAL SENSORINEURAL HEARING LOSS: ICD-10-CM

## 2019-10-01 DIAGNOSIS — H90.3 SENSORINEURAL HEARING LOSS (SNHL), BILATERAL: ICD-10-CM

## 2019-10-01 ASSESSMENT — PAIN SCALES - GENERAL: PAINLEVEL: NO PAIN (0)

## 2019-10-01 ASSESSMENT — MIFFLIN-ST. JEOR: SCORE: 1637.16

## 2019-10-01 NOTE — PROGRESS NOTES
[facility] New Patient Visit Note        Otolaryngology        October 1, 2019    Referring Provider:  Tolu Boston MD         HPI:  Eliceo Griggs is a 69 year old male who presents for evaluation of hearing loss and crackling in the ears.    And reports that his wife has told him for years that he is not hearing well.  In the last 6 months, he reports he is noticed himself.  He has also noticed crackling in both of his ears with swallowing.  He does not notice this when he yawns or chews.  In August 2019, he had a cold.  His symptoms consisted of nasal congestion, sore throat and cough.  He also has bilateral plugging of his ears.  He originally tried Claritin but it did not help.  His son, who is a cardiologist, prescribed him a Z-Thien and that improved the symptoms of the cough and sore throat but not the ear plugging.  He went to his primary care provider again approximately 10 days after taking the Z-Thien he was placed on Nasacort and Allegra.  The plugging has improved quite significantly but he still has a crackling.    He does not have frequent infection history.  He does have exposure to loud noise.  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  He does have a father with a history of a meningioma.  He is a non-smoker and never has smoked.  Only social alcohol use.    The patient denies any hoarseness of voice, referred otalgia, odynophagia, dysphagia, dizziness/lightheadedness, loss of balance, facial paresthesias, tinnitus, hemoptysis, other pain, bleeding, and recurrent/new lumps or bumps. Weight is stable.      ROS:  10 point review of systems completed and is negative except for those listed above    PMH:   Past Medical History:   Diagnosis Date     Basal cell carcinoma      Complication of anesthesia     a long time ago during knee surgery. Patient was nauseous for 3 days after surgery     Other specified malignant neoplasm of skin of  "trunk, except scrotum      Prostate infection     prostatitis     Squamous cell carcinoma    Coronary artery disease, status post 1 stent placed in 2017    PSH:   Past Surgical History:   Procedure Laterality Date     ADENOIDECTOMY       BIOPSY OF SKIN LESION       LITHOTRIPSY       MOHS MICROGRAPHIC PROCEDURE       PROSTATE BIOPSY, NEEDLE, SATURATION SAMPLING       TESTICLE SURGERY       TONSILLECTOMY       VASECTOMY         FamH:   Family History   Problem Relation Age of Onset     Cancer Mother         skin cancer     Cancer Father      Hypertension Father      Skin Cancer No family hx of         no skin cancer       SocH:   Social History     Tobacco Use     Smoking status: Never Smoker     Smokeless tobacco: Never Used   Substance Use Topics     Alcohol use: Yes     Drug use: Never       Medications:   Current Outpatient Medications   Medication     aspirin 81 MG tablet     hydrochlorothiazide (HYDRODIURIL) 25 MG tablet     Multiple Vitamin (MULTIVITAMIN  S) CAPS     rosuvastatin (CRESTOR) 20 MG tablet     No current facility-administered medications for this visit.        Allergies:     Allergies   Allergen Reactions     Penicillins Anaphylaxis     Erythromycin Rash     Sulfa Drugs Hives and Rash         Physical Exam:   BP (!) 165/83   Pulse 72   Ht 1.803 m (5' 11\")   Wt 85.5 kg (188 lb 8 oz)   BMI 26.29 kg/m      Constitutional: The patient was unaccompanied, well-groomed, and in no acute distress.    Head: Normocephalic and atraumatic.   Eyes: Pupils were equal and reactive.  Extraocular movement intact.    Ears: Pinnae and tragus non-tender.  EACs and TMs were clear under microscopic exam.   Nose: Sinuses were non-tender.  Anterior rhinoscopy revealed midline septum and absence of purulence or polyps.    Mouth: Mucosa pink and moist, tonsils non-erythematous, no exudates, uvula midline  Neck: No lymphadenopathy in the neck.  No palpable thyroid.  Normal range of motion  Respiratory: Breathing " comfortably without stridor or exertion of accessory muscles.   Skin: Normal:  warm and pink without rash  Neurologic: Alert and oriented x 3.  CN's III-XII within normal limits.  Voice normal.   Psychiatric: The patient's affect was calm, cooperative, and appropriate.    Communication: Normal; communicates verbally, normal voice quality.        Fiberoptic Endoscopy:  Consent for fiberoptic laryngoscopy was obtained, and we confirmed correctness of procedure and identity of patient.  Fiberoptic laryngoscopy was indicated due to greater than 6 months eustachian tube dysfunction.  The nose was topically decongested and anesthetized.  The fiberoptic laryngoscope was passed under endoscopic vision.  The turbinates were normal.  The inferior and middle meati were clear bilaterally without purulence, masses, or polyps.  The nasopharynx was clear.  The Eustachian tubes were clear.  The soft palate appeared normal with good mobility.  No evidence of mass causing eustachian tube obstruction.    Audiogram October 1, 2019: Follow up with Rhonda Bragg PA-C in ENT as scheduled to assess asymmetrical hearing loss. Consider hearing aid trial if medical clearance.            Assessment/Plan:     1. Dysfunction of both eustachian tubes  Likely related to the environmental allergies.  I did do an endoscopic exam to make sure there was not any eustachian tube obstruction from a mass.  It was negative.  I am hoping that treating the allergic rhinitis with some gentle self insufflation will help improve the symptoms.  Patient will call if they get worse or better.    2. Seasonal allergic rhinitis, unspecified trigger  Patient with nasal congestion and rhinorrhea but is most likely consistent with a seasonal allergic rhinitis.  He has not had any specific allergy testing and does not wish to proceed with this at this time.  We will have him continue the Allegra and Nasonex daily.  We will have him start twice daily sinus rinses.  If  symptoms improved, he should continue that until the weather freezes and the environmental exposure goes away.  If it does not get better, I encouraged him to consider allergy testing.    3. Sensorineural hearing loss (SNHL), bilateral  Patient with bilateral sensorineural hearing loss but it is asymmetric.  Will obtain an MRI to rule out vestibular schwannoma or acoustic neuroma.  If this is unremarkable, patient would be a candidate for hearing amplification.  Patient will arrange an MRI and I will call him with follow-up.    4. Asymmetrical sensorineural hearing loss  See #3 above.          Rhonda Bragg PA-C  Otolaryngology  Head & Neck Surgery  [phone]      CC:  Tolu Boston MD   [facility]

## 2019-10-01 NOTE — LETTER
10/1/2019       RE: Eliceo Griggs  [facility] [place] [address]     Dear Colleague,    Thank you for referring your patient, Eliceo Griggs, to the [facility] EAR NOSE AND THROAT at [facility]. Please see a copy of my visit note below.    [facility] Ear, Nose and Throat Clinic New Patient Visit Note        Otolaryngology        October 1, 2019    Referring Provider:  Tolu Boston MD         HPI:  Eliceo Griggs is a 69 year old male who presents for evaluation of hearing loss and crackling in the ears.    And reports that his wife has told him for years that he is not hearing well.  In the last 6 months, he reports he is noticed himself.  He has also noticed crackling in both of his ears with swallowing.  He does not notice this when he yawns or chews.  In August 2019, he had a cold.  His symptoms consisted of nasal congestion, sore throat and cough.  He also has bilateral plugging of his ears.  He originally tried Claritin but it did not help.  His son, who is a cardiologist, prescribed him a Z-Thien and that improved the symptoms of the cough and sore throat but not the ear plugging.  He went to his primary care provider again approximately 10 days after taking the Z-Thien he was placed on Nasacort and Allegra.  The plugging has improved quite significantly but he still has a crackling.    He does not have frequent infection history.  He does have exposure to loud noise.  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  He does have a father with a history of a meningioma.  He is a non-smoker and never has smoked.  Only social alcohol use.    The patient denies any hoarseness of voice, referred otalgia, odynophagia, dysphagia, dizziness/lightheadedness, loss of balance, facial paresthesias, tinnitus, hemoptysis, other pain, bleeding, and recurrent/new lumps or bumps. Weight is stable.      ROS:  10 point review of systems completed  "and is negative except for those listed above    PMH:   Past Medical History:   Diagnosis Date     Basal cell carcinoma      Complication of anesthesia     a long time ago during knee surgery. Patient was nauseous for 3 days after surgery     Other specified malignant neoplasm of skin of trunk, except scrotum      Prostate infection     prostatitis     Squamous cell carcinoma    Coronary artery disease, status post 1 stent placed in 2017    PSH:   Past Surgical History:   Procedure Laterality Date     ADENOIDECTOMY       BIOPSY OF SKIN LESION       LITHOTRIPSY       MOHS MICROGRAPHIC PROCEDURE       PROSTATE BIOPSY, NEEDLE, SATURATION SAMPLING       TESTICLE SURGERY       TONSILLECTOMY       VASECTOMY         FamH:   Family History   Problem Relation Age of Onset     Cancer Mother         skin cancer     Cancer Father      Hypertension Father      Skin Cancer No family hx of         no skin cancer       SocH:   Social History     Tobacco Use     Smoking status: Never Smoker     Smokeless tobacco: Never Used   Substance Use Topics     Alcohol use: Yes     Drug use: Never       Medications:   Current Outpatient Medications   Medication     aspirin 81 MG tablet     hydrochlorothiazide (HYDRODIURIL) 25 MG tablet     Multiple Vitamin (MULTIVITAMIN  S) CAPS     rosuvastatin (CRESTOR) 20 MG tablet     No current facility-administered medications for this visit.        Allergies:     Allergies   Allergen Reactions     Penicillins Anaphylaxis     Erythromycin Rash     Sulfa Drugs Hives and Rash         Physical Exam:   BP (!) 165/83   Pulse 72   Ht 1.803 m (5' 11\")   Wt 85.5 kg (188 lb 8 oz)   BMI 26.29 kg/m       Constitutional: The patient was unaccompanied, well-groomed, and in no acute distress.    Head: Normocephalic and atraumatic.   Eyes: Pupils were equal and reactive.  Extraocular movement intact.    Ears: Pinnae and tragus non-tender.  EACs and TMs were clear under microscopic exam.   Nose: Sinuses were " non-tender.  Anterior rhinoscopy revealed midline septum and absence of purulence or polyps.    Mouth: Mucosa pink and moist, tonsils non-erythematous, no exudates, uvula midline  Neck: No lymphadenopathy in the neck.  No palpable thyroid.  Normal range of motion  Respiratory: Breathing comfortably without stridor or exertion of accessory muscles.   Skin: Normal:  warm and pink without rash  Neurologic: Alert and oriented x 3.  CN's III-XII within normal limits.  Voice normal.   Psychiatric: The patient's affect was calm, cooperative, and appropriate.    Communication: Normal; communicates verbally, normal voice quality.        Fiberoptic Endoscopy:  Consent for fiberoptic laryngoscopy was obtained, and we confirmed correctness of procedure and identity of patient.  Fiberoptic laryngoscopy was indicated due to greater than 6 months eustachian tube dysfunction.  The nose was topically decongested and anesthetized.  The fiberoptic laryngoscope was passed under endoscopic vision.  The turbinates were normal.  The inferior and middle meati were clear bilaterally without purulence, masses, or polyps.  The nasopharynx was clear.  The Eustachian tubes were clear.  The soft palate appeared normal with good mobility.  No evidence of mass causing eustachian tube obstruction.    Audiogram October 1, 2019: Follow up with Rhnoda Bragg PA-C in ENT as scheduled to assess asymmetrical hearing loss. Consider hearing aid trial if medical clearance.            Assessment/Plan:     1. Dysfunction of both eustachian tubes  Likely related to the environmental allergies.  I did do an endoscopic exam to make sure there was not any eustachian tube obstruction from a mass.  It was negative.  I am hoping that treating the allergic rhinitis with some gentle self insufflation will help improve the symptoms.  Patient will call if they get worse or better.    2. Seasonal allergic rhinitis, unspecified trigger  Patient with nasal congestion and  rhinorrhea but is most likely consistent with a seasonal allergic rhinitis.  He has not had any specific allergy testing and does not wish to proceed with this at this time.  We will have him continue the Allegra and Nasonex daily.  We will have him start twice daily sinus rinses.  If symptoms improved, he should continue that until the weather freezes and the environmental exposure goes away.  If it does not get better, I encouraged him to consider allergy testing.    3. Sensorineural hearing loss (SNHL), bilateral  Patient with bilateral sensorineural hearing loss but it is asymmetric.  Will obtain an MRI to rule out vestibular schwannoma or acoustic neuroma.  If this is unremarkable, patient would be a candidate for hearing amplification.  Patient will arrange an MRI and I will call him with follow-up.    4. Asymmetrical sensorineural hearing loss  See #3 above.          Rhonda Bragg PA-C  Otolaryngology  Head & Neck Surgery  [phone]      CC:  Tolu Boston MD   [facility]

## 2019-10-01 NOTE — PATIENT INSTRUCTIONS
Eliceo Griggs,    It was a pleasure to see you today.    1. You were seen in the ENT Clinic today by Rhonda Bragg PA-C    If you have any questions or concerns after your appointment, please call   - Option 1: ENT Clinic: [phone]      2. Please schedule the MRI brain.  I will call you with results.    3.  Return as needed or at least annually for a hearing check.    4.  For the Eustachian tube dysfunction, this is causing the crackling in the ears.    Use the Nasocort 1 spray to each nostril daily.  Use the Allegra daily  Do saline sinus rinses twice a day.    Consider allergy testing.    Cleaning of the Nasal or Sinus Cavity    Please follow all three steps.  Nasal irrigation (cleaning) should be done 3-4 times/day.    Preparation:  1.  Wash your hands  2.  We suggest that you buy the [employer] Sinus Rinse Kit  3.  Use distilled water or tap water that has been boiled and brought to room temperature.  4.  Fill the irrigation bottle with room temperature water (distilled or boiled tap water) and add mixture (pre made packet or homemade recipe).        If you wish to make a homemade recipe:        Mix 1/4 teaspoon salt with 1/4 teaspoon baking soda in each bottle.    Cleansing Irrigation/Sinus Rinse:    1.  Lean forward over a sink and insert the rinse bottle applicator into the right side of your nose.    2.  Tilt head down and to the left side.  With your mouth open (breathing through your mouth), gently direct the water around the inside of your nose until clear fluid starts to drain from the opposite nostril.  This is called flushing or irrigation.    3.  When you have used 1/4 to 1/2 or the solution, switch to the left nostril.    4.  To irrigate the left nostril, tilt your head down and to the right side.  With your mouth open (breathing through your mouth),  gently direct the water around the inside of your nose until clear fluid starts to drain from the opposite nostril.     Cleaning the  Equipment:  1.  Throw away any leftover solution  2.  Clean the rinse bottle and cap with clear water. Air dry.        Thank you,  Rhonda Bragg PA-C  Otolaryngology  Head & Neck Surgery  [phone]

## 2019-10-01 NOTE — PROGRESS NOTES
AUDIOLOGY REPORT    SUMMARY: Audiology visit completed. See audiogram for results.      RECOMMENDATIONS: Follow-up with ENT.      Junaid Miguel, CCC-A  Licensed Audiologist  [place] #[id number]

## 2019-10-30 ENCOUNTER — ANCILLARY PROCEDURE (OUTPATIENT)
Dept: MRI IMAGING | Facility: CLINIC | Age: 69
End: 2019-10-30
Attending: PHYSICIAN ASSISTANT
Payer: COMMERCIAL

## 2019-10-30 DIAGNOSIS — H90.3 ASYMMETRICAL SENSORINEURAL HEARING LOSS: ICD-10-CM

## 2019-10-30 RX ORDER — GADOBUTROL 604.72 MG/ML
7.5 INJECTION INTRAVENOUS ONCE
Status: COMPLETED | OUTPATIENT
Start: 2019-10-30 | End: 2019-10-30

## 2019-10-30 RX ADMIN — GADOBUTROL 7.5 ML: 604.72 INJECTION INTRAVENOUS at 09:19

## 2019-11-06 ENCOUNTER — HEALTH MAINTENANCE LETTER (OUTPATIENT)
Age: 69
End: 2019-11-06

## 2019-12-04 ENCOUNTER — TELEPHONE (OUTPATIENT)
Dept: OTOLARYNGOLOGY | Facility: CLINIC | Age: 69
End: 2019-12-04

## 2019-12-04 NOTE — TELEPHONE ENCOUNTER
Health Call Center    Phone Message    May a detailed message be left on voicemail: yes    Reason for Call: Requesting Results   Name/type of test: Eliceo ELLSWORTH would like a call back to also to discuss next steps or any follow up that may be needed  Date of test: 10/20/2019  Was test done at a location other than [facility] (Please fill in the location if not [facility])?: No      Action Taken: Message routed to:  [facility] ([facility]

## 2019-12-05 NOTE — TELEPHONE ENCOUNTER
Patient is now medically cleared to pursue hearing aids (told him this in the message I left, but can you please reiterate it?).    He could go anywhere he'd like for hearing aids, but I could place a referral for here if he'd like.    Thank you,  Rhonda Bragg PA-C, 12/5/2019, 9:55 AM

## 2019-12-05 NOTE — TELEPHONE ENCOUNTER
Left vm message for pt advising him that he is cleared to pursue hearing aids.call back number left on voicemail in case of further questions.

## 2019-12-05 NOTE — TELEPHONE ENCOUNTER
Spoke to patient and gave mri results as stated by provider on the result note. Pt is ok with results but is wondering  What next steps in plan of care are. Pt mentioned hearing aids were discussed at last visit. This writer informed pt that his message would be relayed to provider, since further plan would be based on findings of MRI.pt verbalized understanding and agreeable to plan.

## 2020-01-16 ENCOUNTER — OFFICE VISIT (OUTPATIENT)
Dept: DERMATOLOGY | Facility: CLINIC | Age: 70
End: 2020-01-16
Payer: COMMERCIAL

## 2020-01-16 DIAGNOSIS — D18.01 CHERRY ANGIOMA: ICD-10-CM

## 2020-01-16 DIAGNOSIS — L82.1 SEBORRHEIC KERATOSIS: ICD-10-CM

## 2020-01-16 DIAGNOSIS — D48.5 NEOPLASM OF UNCERTAIN BEHAVIOR OF SKIN: Primary | ICD-10-CM

## 2020-01-16 ASSESSMENT — PAIN SCALES - GENERAL
PAINLEVEL: NO PAIN (0)
PAINLEVEL: NO PAIN (0)

## 2020-01-16 NOTE — LETTER
1/16/2020       RE: Eliceo Griggs  [address] [place] [address]     Dear Colleague,    Thank you for referring your patient, Eliceo Griggs, to the [facility] DERMATOLOGY at [facility]. Please see a copy of my visit note below.    [facility] Dermatology Note      Dermatology Problem List:  UBSE 1/16/20   0. NUB, lower anterior neck, DDx includes BCC vs nevus, s/p shave bx 1/16/20  1. NMSC   - BCC 01/2019 L clavicle- s/p imiquimod              - BCC 02/2016 x3 from right chest, right neck, and mid back s/p ED&C              - BCC 06/2014, right cheek s/p Mohs  2. Hypertrophic scar at prior ED&C site of superficial BCC from mid back s/p biopsy 4/14/16  3. Seborrheic dermatitis  - Hydrocortisone 2.5% ointment prn   4. AKs  5. Dysplastic nevus   - Right lower back, moderate dysplasia, biopsy 6/10/14, excision 7/24/14  5. Lesion to monitor   - R upper paraspinal with ~4mm nevus with slightly darker reticular network in areas; photos taken 01/04/19     Encounter Date: Jan 16, 2020    CC:   Chief Complaint   Patient presents with     Derm Problem     Eliceo is concerned about a lesion on his throat     History of Present Illness:  Mr. Eliceo Griggs is a 69 year old male who with a history of skin cancer and dysplastic nevus presents for a skin check. Last seen 6/24/19 at which time he had 2 AKs treated with LN2. No family hx of melanoma but his mother has had BCCs and SCCs. He is much better about sun protection now and wears hats and SPF 50 nearly every day. Today, he reports a new red bump on the anterior neck that he noticed about 1-2 weeks ago. It is asymptomatic. No other bleeding, itchy, or painful spots. Otherwise, feels well and no other skin complaints.       Past Medical History:   Patient Active Problem List   Diagnosis     Dysplastic nevi     Dermatitis, seborrheic     Prostate cancer (H)     Past Medical History:   Diagnosis Date      Basal cell carcinoma      Complication of anesthesia     a long time ago during knee surgery. Patient was nauseous for 3 days after surgery     Other specified malignant neoplasm of skin of trunk, except scrotum      Prostate infection     prostatitis     Squamous cell carcinoma      Past Surgical History:   Procedure Laterality Date     ADENOIDECTOMY       BIOPSY OF SKIN LESION       LITHOTRIPSY       MOHS MICROGRAPHIC PROCEDURE       PROSTATE BIOPSY, NEEDLE, SATURATION SAMPLING       TESTICLE SURGERY       TONSILLECTOMY       VASECTOMY         Social History:  Patient reports that he has never smoked. He has never used smokeless tobacco. He reports current alcohol use. He reports that he does not use drugs.    Family History:  Family History   Problem Relation Age of Onset     Cancer Mother         skin cancer     Cancer Father      Hypertension Father      Skin Cancer No family hx of         no skin cancer       Medications:  Current Outpatient Medications   Medication Sig Dispense Refill     aspirin 81 MG tablet Take 81 mg by mouth       hydrochlorothiazide (HYDRODIURIL) 25 MG tablet Take 25 mg by mouth       Multiple Vitamin (MULTIVITAMIN  S) CAPS Take 1 capsule by mouth       rosuvastatin (CRESTOR) 20 MG tablet Take 20 mg by mouth          Allergies   Allergen Reactions     Penicillins Anaphylaxis     Erythromycin Rash     Sulfa Drugs Hives and Rash       Review of Systems:  -Constitutional:  Feeling well, in usual state of health.  -Skin:  As per HPI, no additional concerns.      Physical exam:  Vitals: There were no vitals taken for this visit.  GEN: This is a well developed, well-nourished male in no acute distress, in a pleasant mood.    SKIN: Waist-up skin, which includes the head/face, neck, both arms, chest, back, abdomen, digits and/or nails was examined.  -Lower anterior neck with 2mm pearly pink papule with telangiectasias  -Hypopigmented slightly atrophic patch overlying the L clavicle  -Few  scattered stuck-on tan to dark brown papules and plaques on trunk and arms  -Multiple scattered well-demarcated symmetric dark brown macules and papules, most with reticular pattern on dermoscopy, on trunk and arms  -Scattered cherry red papules on trunk  -No other lesions of concern on areas examined.     Impression/Plan:  1. NUB, lower anterior neck, DDx includes BCC    Shave biopsy:  After discussion of benefits and risks including but not limited to bleeding/bruising, pain/swelling, infection, scar, incomplete removal, nerve damage/numbness, recurrence, and non-diagnostic biopsy, written consent, verbal consent and photographs were obtained. Time-out was performed. The area was cleaned with isopropyl alcohol. 0.5mL of 1% lidocaine with epinephrine was injected to obtain adequate anesthesia of the lesion on the lower anterior neck. A shave biopsy was performed. Hemostasis was achieved with aluminium chloride. Vaseline and a sterile dressing were applied. The patient tolerated the procedure and no complications were noted. The patient was provided with verbal and written post care instructions.     2. History of NMSC, no evidence of recurrence to date    Recommended use of a broad spectrum sunscreen of at least SPF 30 on all sun exposed sites daily.  Apply 20 minutes prior to exposure and repeat application every two hours or after sweating or swimming.  Avoid any intentional indoor or outdoor tanning.      3. Benign findings including banal-appearing nevi, cherry angiomata, SKs    Reassured patient of benign nature of lesions    Follow-up in 6 months for FBSE, earlier pending biopsy results or for new or changing lesions.     Dr. Garcia staffed the patient.    Staff Involved:  Resident(Malissa Smith)/Staff(as above)    Malissa Smith M.D.  PGY-4, Dermatology  [facility]    Staff Physician Comments:   I saw and evaluated the patient with the resident and I edited the assessment and plan as documented  in the note. I was present for the key portions of the above major procedure and examination.    Jonh Garcia MD   of Dermatology  Department of Dermatology  [facility]

## 2020-01-16 NOTE — PATIENT INSTRUCTIONS

## 2020-01-16 NOTE — NURSING NOTE
Dermatology Rooming Note    Eliceo Griggs's goals for this visit include:   Chief Complaint   Patient presents with     Derm Problem     Eliceo is concerned about a lesion on his throat     Carijeannette Gill, EMT

## 2020-01-16 NOTE — PROGRESS NOTES
[facility] Dermatology Note      Dermatology Problem List:  UBSE 1/16/20   0. NUB, lower anterior neck, DDx includes BCC vs nevus, s/p shave bx 1/16/20  1. NMSC   - BCC 01/2019 L clavicle- s/p imiquimod              - BCC 02/2016 x3 from right chest, right neck, and mid back s/p ED&C              - BCC 06/2014, right cheek s/p Mohs  2. Hypertrophic scar at prior ED&C site of superficial BCC from mid back s/p biopsy 4/14/16  3. Seborrheic dermatitis  - Hydrocortisone 2.5% ointment prn   4. AKs  5. Dysplastic nevus   - Right lower back, moderate dysplasia, biopsy 6/10/14, excision 7/24/14  5. Lesion to monitor   - R upper paraspinal with ~4mm nevus with slightly darker reticular network in areas; photos taken 01/04/19     Encounter Date: Jan 16, 2020    CC:   Chief Complaint   Patient presents with     Derm Problem     Eliceo is concerned about a lesion on his throat     History of Present Illness:  Mr. Eliceo Griggs is a 69 year old male who with a history of skin cancer and dysplastic nevus presents for a skin check. Last seen 6/24/19 at which time he had 2 AKs treated with LN2. No family hx of melanoma but his mother has had BCCs and SCCs. He is much better about sun protection now and wears hats and SPF 50 nearly every day. Today, he reports a new red bump on the anterior neck that he noticed about 1-2 weeks ago. It is asymptomatic. No other bleeding, itchy, or painful spots. Otherwise, feels well and no other skin complaints.       Past Medical History:   Patient Active Problem List   Diagnosis     Dysplastic nevi     Dermatitis, seborrheic     Prostate cancer (H)     Past Medical History:   Diagnosis Date     Basal cell carcinoma      Complication of anesthesia     a long time ago during knee surgery. Patient was nauseous for 3 days after surgery     Other specified malignant neoplasm of skin of trunk, except scrotum      Prostate infection     prostatitis     Squamous cell carcinoma       Past Surgical History:   Procedure Laterality Date     ADENOIDECTOMY       BIOPSY OF SKIN LESION       LITHOTRIPSY       MOHS MICROGRAPHIC PROCEDURE       PROSTATE BIOPSY, NEEDLE, SATURATION SAMPLING       TESTICLE SURGERY       TONSILLECTOMY       VASECTOMY         Social History:  Patient reports that he has never smoked. He has never used smokeless tobacco. He reports current alcohol use. He reports that he does not use drugs.    Family History:  Family History   Problem Relation Age of Onset     Cancer Mother         skin cancer     Cancer Father      Hypertension Father      Skin Cancer No family hx of         no skin cancer       Medications:  Current Outpatient Medications   Medication Sig Dispense Refill     aspirin 81 MG tablet Take 81 mg by mouth       hydrochlorothiazide (HYDRODIURIL) 25 MG tablet Take 25 mg by mouth       Multiple Vitamin (MULTIVITAMIN  S) CAPS Take 1 capsule by mouth       rosuvastatin (CRESTOR) 20 MG tablet Take 20 mg by mouth          Allergies   Allergen Reactions     Penicillins Anaphylaxis     Erythromycin Rash     Sulfa Drugs Hives and Rash       Review of Systems:  -Constitutional:  Feeling well, in usual state of health.  -Skin:  As per HPI, no additional concerns.      Physical exam:  Vitals: There were no vitals taken for this visit.  GEN: This is a well developed, well-nourished male in no acute distress, in a pleasant mood.    SKIN: Waist-up skin, which includes the head/face, neck, both arms, chest, back, abdomen, digits and/or nails was examined.  -Lower anterior neck with 2mm pearly pink papule with telangiectasias  -Hypopigmented slightly atrophic patch overlying the L clavicle  -Few scattered stuck-on tan to dark brown papules and plaques on trunk and arms  -Multiple scattered well-demarcated symmetric dark brown macules and papules, most with reticular pattern on dermoscopy, on trunk and arms  -Scattered cherry red papules on trunk  -No other lesions of concern on  areas examined.     Impression/Plan:  1. NUB, lower anterior neck, DDx includes BCC    Shave biopsy:  After discussion of benefits and risks including but not limited to bleeding/bruising, pain/swelling, infection, scar, incomplete removal, nerve damage/numbness, recurrence, and non-diagnostic biopsy, written consent, verbal consent and photographs were obtained. Time-out was performed. The area was cleaned with isopropyl alcohol. 0.5mL of 1% lidocaine with epinephrine was injected to obtain adequate anesthesia of the lesion on the lower anterior neck. A shave biopsy was performed. Hemostasis was achieved with aluminium chloride. Vaseline and a sterile dressing were applied. The patient tolerated the procedure and no complications were noted. The patient was provided with verbal and written post care instructions.     2. History of NMSC, no evidence of recurrence to date    Recommended use of a broad spectrum sunscreen of at least SPF 30 on all sun exposed sites daily.  Apply 20 minutes prior to exposure and repeat application every two hours or after sweating or swimming.  Avoid any intentional indoor or outdoor tanning.      3. Benign findings including banal-appearing nevi, cherry angiomata, SKs    Reassured patient of benign nature of lesions    Follow-up in 6 months for FBSE, earlier pending biopsy results or for new or changing lesions.     Dr. Garcia staffed the patient.    Staff Involved:  Resident(Malissa Smith)/Staff(as above)    Malissa Smith M.D.  PGY-4, Dermatology  [facility]    Staff Physician Comments:   I saw and evaluated the patient with the resident and I edited the assessment and plan as documented in the note. I was present for the key portions of the above major procedure and examination.    Jonh Garcia MD   of Dermatology  Department of Dermatology  [facility] [employer]

## 2020-01-17 LAB — COPATH REPORT: NORMAL

## 2020-01-23 NOTE — RESULT ENCOUNTER NOTE
Biopsy of lower anterior neck showed nBCC. Plan for excision. Left VM for patient and sent [employer] message. Staffed with Dr. Garcia.

## 2020-01-27 ENCOUNTER — OFFICE VISIT (OUTPATIENT)
Dept: AUDIOLOGY | Facility: CLINIC | Age: 70
End: 2020-01-27
Payer: COMMERCIAL

## 2020-01-27 DIAGNOSIS — H90.3 SENSORINEURAL HEARING LOSS, BILATERAL: Primary | ICD-10-CM

## 2020-01-27 NOTE — PROGRESS NOTES
AUDIOLOGY REPORT    SUBJECTIVE: Eliceo Griggs is a 69 year old male who was seen in the Audiology Clinic at [facility] on 1/27/2020 to discuss concerns with hearing and functional communication difficulties. The patient has been seen previously on 10/1/2019, and results revealed normal hearing sloping to moderately severe sensorineural hearing loss bilaterally. He was medically evaluated and determined to be cleared for bilateral hearing aids by SHASHA Cui. Eliceo notes difficulty with communication in a variety of listening situations, especially when people are talking behind him and in background noise.    OBJECTIVE: The patient has hearing aid benefits through [employer]. Explained to him that this clinic is not a part of [employer] [facility]. He would still like to move forward with a hearing aid evaluation today. An Advance Beneficiary Notice of Noncoverage was signed. Eliceo was presented with different options for amplification to help aid in communication. Discussed styles, levels of technology, monaural vs. binaural fitting, Android compatibility, and rechargeable options.     The hearing aids mutually chosen were:  BILATERAL: Unitron Moxi Jump 5  COLOR: Platinum  BATTERY SIZE: Rechargeable  EARMOLD/TIPS: Medium closed domes  CANAL/ LENGTH: 2    ASSESSMENT: Reviewed purchase information and warranty information with the patient. The 45 day trial period was explained to the patient. The patient was given a copy of the [facility] consumer brochure on purchasing hearing instruments. Patient risk factors have been provided to the patient in writing prior to the sale of the hearing aid per FDA regulation. The risk factors are also available in the User Instructional Booklet to be presented on the day of the hearing aid fitting. Hearing aid evaluation completed.    PLAN: Eliceo is scheduled to return on 3/2/2020 for a hearing aid  fitting and programming. He would like to explore his [facility] benefits before making a decision. He will contact the clinic by 2/10/2020 if he would like to proceed with ordering the hearing aids. Please contact this clinic with any questions or concerns.      Junaid Lopez, [facility]  Licensed Audiologist  [place] #[id number]

## 2020-02-16 ENCOUNTER — HEALTH MAINTENANCE LETTER (OUTPATIENT)
Age: 70
End: 2020-02-16

## 2020-03-12 ENCOUNTER — OFFICE VISIT (OUTPATIENT)
Dept: DERMATOLOGY | Facility: CLINIC | Age: 70
End: 2020-03-12
Payer: COMMERCIAL

## 2020-03-12 DIAGNOSIS — C44.41 BASAL CELL CARCINOMA (BCC) OF SKIN OF NECK: Primary | ICD-10-CM

## 2020-03-12 ASSESSMENT — PAIN SCALES - GENERAL
PAINLEVEL: NO PAIN (0)
PAINLEVEL: NO PAIN (0)

## 2020-03-12 NOTE — PROGRESS NOTES
DERMATOLOGY EXCISION PROCEDURE NOTE    Dermatology Problem List:  UBSE 1/16/20   1. NMSC   - BCC, lower anterior neck, s/p excision 3/12/20              - BCC 01/2019 L clavicle- s/p imiquimod              - BCC 02/2016 x3 from right chest, right neck, and mid back s/p ED&C              - BCC 06/2014, right cheek s/p Mohs  2. Hypertrophic scar at prior ED&C site of superficial BCC from mid back s/p biopsy 4/14/16  3. Seborrheic dermatitis  - Hydrocortisone 2.5% ointment prn   4. AKs  5. Dysplastic nevus              - Right lower back, moderate dysplasia, biopsy 6/10/14, excision 7/24/14  5. Lesion to monitor   - R upper paraspinal with ~4mm nevus with slightly darker reticular network in areas; photos taken 01/04/19     NAME OF PROCEDURE: Excision intermediate layered linear closure  Staff surgeon: Dr. Jonh Garcia  Resident: Dr. Malissa Smith  Scrub Nurse: Simran Payne    PRE-OPERATIVE DIAGNOSIS:  Basal Cell Carcinoma  POST-OPERATIVE DIAGNOSIS: Same   LOCATION: lower anterior neck  FINAL EXCISION SIZE(DEFECT SIZE): 1 x 1 cm  MARGIN: 0.4 cm  FINAL REPAIR LENGTH: 3.2 cm   ANESTHESIA: 1% lidocaine with 1:100,000 epinephrine      INDICATIONS: This patient presented with a 0.5 x 0.5 cm Basal Cell Carcinoma. Excision was indicated. We discussed the principles of treatment and most likely complications including scarring, bleeding, infection, incomplete excision, wound dehiscence, pain, nerve damage, and recurrence. Informed consent was obtained and the patient underwent the procedure as follows:    PROCEDURE: The patient was taken to the operative suite. Time-out was performed.  The treatment area was anesthetized with 1% lidocaine with epinephrine. The area was prepped with Chlorhexidine and rinsed with sterile saline and draped with sterile towels. The lesion was delineated and excised down to subcutaneous fat in a elliptical manner. Hemostasis was obtained by electrocoagulation.     REPAIR: An intermediate layered  linear closure was selected as the procedure which would maximally preserve both function and cosmesis.    After the excision of the tumor, the area was carefully undermined. Hemostasis was obtained with electrocoagulation.  Closure was oriented so that the wound was in the patient's natural skin tension lines. The subcutaneous and dermal layers were then closed with 4-0 monocryl sutures. The epidermis was then carefully approximated along the length of the wound using 5-0 fast-absorbing gut simple running sutures.     Estimated blood loss was less than 10 ml for all surgical sites. A sterile pressure dressing was applied and wound care instructions, with a written handout, were given. The patient was [facility] alert and ambulatory.    Follow-up in 3 months for full body skin check or sooner for changing lesions.     Dr. Garcia was immediately available for the entire surgery and was physicially present for the key portions of the procedure.    Anatomic Pathology Results: pending    Clinical Follow-Up: 3 months     Staff Involved:  Resident(Malissa Smith)/Staff(as above)    Malissa Smith M.D.  PGY-4, Dermatology  [facility]    Staff Physician Comments:   I saw and evaluated the patient with the resident and I edited the assessment and plan as documented in the note. I was present for the key portions of the above major procedure and examination.    Jonh Garcia MD   of Dermatology  Department of Dermatology  [facility]

## 2020-03-12 NOTE — LETTER
3/12/2020       RE: Eliceo Griggs  [address] [place] [address]     Dear Colleague,    Thank you for referring your patient, Eliceo Griggs, to the [facility] DERMATOLOGY at [facility]. Please see a copy of my visit note below.    DERMATOLOGY EXCISION PROCEDURE NOTE    Dermatology Problem List:  UBSE 1/16/20   1. NMSC   - BCC, lower anterior neck, s/p excision 3/12/20              - BCC 01/2019 L clavicle- s/p imiquimod              - BCC 02/2016 x3 from right chest, right neck, and mid back s/p ED&C              - BCC 06/2014, right cheek s/p Mohs  2. Hypertrophic scar at prior ED&C site of superficial BCC from mid back s/p biopsy 4/14/16  3. Seborrheic dermatitis  - Hydrocortisone 2.5% ointment prn   4. AKs  5. Dysplastic nevus              - Right lower back, moderate dysplasia, biopsy 6/10/14, excision 7/24/14  5. Lesion to monitor   - R upper paraspinal with ~4mm nevus with slightly darker reticular network in areas; photos taken 01/04/19     NAME OF PROCEDURE: Excision intermediate layered linear closure  Staff surgeon: Dr. Jonh Garcia  Resident: Dr. Malissa Smith  Scrub Nurse: Simran Payne    PRE-OPERATIVE DIAGNOSIS:  Basal Cell Carcinoma  POST-OPERATIVE DIAGNOSIS: Same   LOCATION: lower anterior neck  FINAL EXCISION SIZE(DEFECT SIZE): 1 x 1 cm  MARGIN: 0.4 cm  FINAL REPAIR LENGTH: 3.2 cm   ANESTHESIA: 1% lidocaine with 1:100,000 epinephrine      INDICATIONS: This patient presented with a 0.5 x 0.5 cm Basal Cell Carcinoma. Excision was indicated. We discussed the principles of treatment and most likely complications including scarring, bleeding, infection, incomplete excision, wound dehiscence, pain, nerve damage, and recurrence. Informed consent was obtained and the patient underwent the procedure as follows:    PROCEDURE: The patient was taken to the operative suite. Time-out was performed.  The treatment area was anesthetized with 1% lidocaine with  epinephrine. The area was prepped with Chlorhexidine and rinsed with sterile saline and draped with sterile towels. The lesion was delineated and excised down to subcutaneous fat in a elliptical manner. Hemostasis was obtained by electrocoagulation.     REPAIR: An intermediate layered linear closure was selected as the procedure which would maximally preserve both function and cosmesis.    After the excision of the tumor, the area was carefully undermined. Hemostasis was obtained with electrocoagulation.  Closure was oriented so that the wound was in the patient's natural skin tension lines. The subcutaneous and dermal layers were then closed with 4-0 monocryl sutures. The epidermis was then carefully approximated along the length of the wound using 5-0 fast-absorbing gut simple running sutures.     Estimated blood loss was less than 10 ml for all surgical sites. A sterile pressure dressing was applied and wound care instructions, with a written handout, were given. The patient was discharged from the [facility] alert and ambulatory.    Follow-up in 3 months for full body skin check or sooner for changing lesions.     Dr. Garcia was immediately available for the entire surgery and was physicially present for the key portions of the procedure.    Anatomic Pathology Results: pending    Clinical Follow-Up: 3 months     Staff Involved:  Resident(Malissa Smith)/Staff(as above)    Malissa Smith M.D.  PGY-4, Dermatology  [facility]    Staff Physician Comments:   I saw and evaluated the patient with the resident and I edited the assessment and plan as documented in the note. I was present for the key portions of the above major procedure and examination.    Jonh Garcia MD   of Dermatology  Department of Dermatology  [facility]

## 2020-03-12 NOTE — PATIENT INSTRUCTIONS

## 2020-03-12 NOTE — NURSING NOTE
Dermatology Rooming Note    Eliceo Griggs's goals for this visit include:   Chief Complaint   Patient presents with     Derm Problem     Abndrew is here today for an excision on his lower anterior neck.      ANN-MARIE Syed

## 2020-03-12 NOTE — NURSING NOTE
Lidocaine-epinephrine 1-1:957210 % injection   12mL once for one use, starting 3/12/2020 ending 3/12/2020,  2mL disp, R-0, injection  Injected by Dr. Smith

## 2020-03-14 LAB — COPATH REPORT: NORMAL

## 2020-11-29 ENCOUNTER — HEALTH MAINTENANCE LETTER (OUTPATIENT)
Age: 70
End: 2020-11-29

## 2020-12-10 ENCOUNTER — RECORDS - HEALTHEAST (OUTPATIENT)
Dept: LAB | Facility: CLINIC | Age: 70
End: 2020-12-10

## 2020-12-10 LAB
ALBUMIN SERPL-MCNC: 3.8 G/DL (ref 3.5–5)
ALP SERPL-CCNC: 72 U/L (ref 45–120)
ALT SERPL W P-5'-P-CCNC: 45 U/L (ref 0–45)
ANION GAP SERPL CALCULATED.3IONS-SCNC: 9 MMOL/L (ref 5–18)
AST SERPL W P-5'-P-CCNC: 37 U/L (ref 0–40)
BILIRUB SERPL-MCNC: 0.5 MG/DL (ref 0–1)
BUN SERPL-MCNC: 26 MG/DL (ref 8–28)
CALCIUM SERPL-MCNC: 9.3 MG/DL (ref 8.5–10.5)
CHLORIDE BLD-SCNC: 108 MMOL/L (ref 98–107)
CHOLEST SERPL-MCNC: 106 MG/DL
CO2 SERPL-SCNC: 25 MMOL/L (ref 22–31)
CREAT SERPL-MCNC: 0.82 MG/DL (ref 0.7–1.3)
FASTING STATUS PATIENT QL REPORTED: YES
GFR SERPL CREATININE-BSD FRML MDRD: >60 ML/MIN/1.73M2
GLUCOSE BLD-MCNC: 102 MG/DL (ref 70–125)
HDLC SERPL-MCNC: 58 MG/DL
IRON SATN MFR SERPL: 12 % (ref 20–50)
IRON SERPL-MCNC: 44 UG/DL (ref 42–175)
LDLC SERPL CALC-MCNC: 41 MG/DL
POTASSIUM BLD-SCNC: 4.3 MMOL/L (ref 3.5–5)
PROT SERPL-MCNC: 6.6 G/DL (ref 6–8)
PSA SERPL-MCNC: <0.1 NG/ML (ref 0–6.5)
SODIUM SERPL-SCNC: 142 MMOL/L (ref 136–145)
TIBC SERPL-MCNC: 357 UG/DL (ref 313–563)
TRANSFERRIN SERPL-MCNC: 286 MG/DL (ref 212–360)
TRIGL SERPL-MCNC: 34 MG/DL
VIT B12 SERPL-MCNC: 809 PG/ML (ref 213–816)

## 2020-12-11 LAB — 25(OH)D3 SERPL-MCNC: 40.1 NG/ML (ref 30–80)

## 2020-12-17 ENCOUNTER — OFFICE VISIT (OUTPATIENT)
Dept: DERMATOLOGY | Facility: CLINIC | Age: 70
End: 2020-12-17
Payer: COMMERCIAL

## 2020-12-17 DIAGNOSIS — L82.1 SEBORRHEIC KERATOSIS: ICD-10-CM

## 2020-12-17 DIAGNOSIS — Z85.828 HISTORY OF NONMELANOMA SKIN CANCER: ICD-10-CM

## 2020-12-17 DIAGNOSIS — L81.4 SOLAR LENTIGO: ICD-10-CM

## 2020-12-17 DIAGNOSIS — L21.9 DERMATITIS, SEBORRHEIC: ICD-10-CM

## 2020-12-17 DIAGNOSIS — Z87.898 HISTORY OF ATYPICAL NEVUS: ICD-10-CM

## 2020-12-17 DIAGNOSIS — D48.5 NEOPLASM OF UNCERTAIN BEHAVIOR OF SKIN: Primary | ICD-10-CM

## 2020-12-17 DIAGNOSIS — D22.9 MULTIPLE BENIGN NEVI: ICD-10-CM

## 2020-12-17 PROCEDURE — 11102 TANGNTL BX SKIN SINGLE LES: CPT | Performed by: DERMATOLOGY

## 2020-12-17 PROCEDURE — 11103 TANGNTL BX SKIN EA SEP/ADDL: CPT | Performed by: DERMATOLOGY

## 2020-12-17 PROCEDURE — 88305 TISSUE EXAM BY PATHOLOGIST: CPT | Performed by: DERMATOLOGY

## 2020-12-17 PROCEDURE — 99214 OFFICE O/P EST MOD 30 MIN: CPT | Mod: 25 | Performed by: DERMATOLOGY

## 2020-12-17 ASSESSMENT — PAIN SCALES - GENERAL: PAINLEVEL: NO PAIN (0)

## 2020-12-17 NOTE — LETTER
12/17/2020       RE: Eliceo Griggs  [address] [place] [address]     Dear Colleague,    Thank you for referring your patient, Eliceo Griggs, to the [facility] DERMATOLOGY [facility]INIC [place] at [facility]. Please see a copy of my visit note below.    [facility] Dermatology Note      Dermatology Problem List:  1. NMSC              - BCC, lower anterior neck, s/p excision 3/12/20              - BCC 01/2019 L clavicle, s/p imiquimod              - BCC 02/2016 x3 from right chest, right neck, and mid back s/p ED&C              - BCC 06/2014, right cheek s/p Mohs  2. Hypertrophic scar at prior ED&C site of superficial BCC from mid back s/p biopsy 4/14/16  3. Seborrheic dermatitis  - Hydrocortisone 2.5% ointment prn   4. AKs  5. Dysplastic nevus              - Right lower back, moderate dysplasia, biopsy 6/10/14, excision 7/24/14  5. NUB x 3, s/p shave biopsies 12/17/2020   - Left central upper abdomen  - Left clavicle  - Right lateral neck    Encounter Date: Dec 17, 2020    CC:  Chief Complaint   Patient presents with     Derm Problem     Eliceo is here for a few spots of concern on head, neck, arm and abdomen.          History of Present Illness:  Mr. Eliceo Griggs is a 70 year old male who with a history of skin cancer presents skin check.    Today patient has four spots of concern. Reports his wife noticed a spot on his left occipital scalp 6 weeks ago. No itching, pain or bleeding. He has a spot on his right neck that has not healed. Reports it bleeds from rubbing on shirt collars once in a while and scabs over. No increase in size. He noticed a spot on his left forearm a couple of weeks ago. Reports it appeared suddenly. No itching, bleeding or pain. Has not been increasing in size. He noticed a bump on his right abdomen about two weeks ago with surrounding bruising. Feels like a marble under the skin. Bruising has since resolved. His  son is a cardiologist and thinks it may be a sebaceous cyst or seroma. It has since decreased in size to about a quarter of what it was. No drainage or bleeding. Additionally, he has some dry patches on his right upper chest, right lower back. The incision on left anterior neck healed up well.  He has a history of multiple BCC and a SCC on his head behind right ear about 15 years. His mother has a history of BCC. Worked as a  for years. He uses SPF50 all over when he goes outside. Otherwise healthy. Recent physical with hgb down from 14 to 12, has appointment with GI for further evaluation. No new medications.     Past Medical History:   Patient Active Problem List   Diagnosis     Dysplastic nevi     Dermatitis, seborrheic     Prostate cancer (H)     Past Medical History:   Diagnosis Date     Basal cell carcinoma      Complication of anesthesia     a long time ago during knee surgery. Patient was nauseous for 3 days after surgery     Other specified malignant neoplasm of skin of trunk, except scrotum      Prostate infection     prostatitis     Squamous cell carcinoma      Past Surgical History:   Procedure Laterality Date     ADENOIDECTOMY       BIOPSY OF SKIN LESION       LITHOTRIPSY       MOHS MICROGRAPHIC PROCEDURE       PROSTATE BIOPSY, NEEDLE, SATURATION SAMPLING       TESTICLE SURGERY       TONSILLECTOMY       VASECTOMY         Social History:  Patient reports that he has never smoked. He has never used smokeless tobacco. He reports current alcohol use. He reports that he does not use drugs.    Family History:  Family History   Problem Relation Age of Onset     Cancer Mother         skin cancer     Cancer Father      Hypertension Father      Skin Cancer No family hx of         no skin cancer       Medications:  Current Outpatient Medications   Medication Sig Dispense Refill     aspirin 81 MG tablet Take 81 mg by mouth       hydrochlorothiazide (HYDRODIURIL) 25 MG tablet Take 25 mg by mouth        Multiple Vitamin (MULTIVITAMIN  S) CAPS Take 1 capsule by mouth       rosuvastatin (CRESTOR) 20 MG tablet Take 20 mg by mouth       Allergies   Allergen Reactions     Penicillins Anaphylaxis     Erythromycin Rash     Sulfa Drugs Hives and Rash         Review of Systems:  -Constitutional: Otherwise feeling well today, in usual state of health.  -Skin: As above in HPI. No additional skin concerns.    Physical exam:  Vitals: There were no vitals taken for this visit.  GEN: This is a well developed, well-nourished male in no acute distress, in a pleasant mood.    SKIN: Full skin, which includes the head/face, both arms, chest, back, abdomen,both legs, genitalia and/or groin buttocks, digits and/or nails, was examined.  -Small cherry red macule on the left parietal scalp.   -Erythematous plaques with greasy scale on nasal bridge and nasolabial folds.  -Erythematous papule with heme-crust right lateral neck.   -Pearly papule left upper abdomen.   -Well circumscribed pearly pink plaque measuring 1 cm in diameter to left clavicle at site of prior BCC treated with imiquimod.  -Erythematous dark brown patch with scalloped border measuring 1 cm in diameter.  -At the right lower abdomen, there is a well circumscribed pea-sized mobile nodule  -Well demarcated pigmented lesions with waxy surface and stuck on appearance affecting the face, trunk, arms.   -Multiple regular brown pigmented macules and papules are identified on the trunk and extremities.   -No other lesions of concern on areas examined.              Labs:  N/A    Impression/Plan:    1. Neoplasm of Uncertain Behavior x 3, left clavicle (previously treated with imiquimod), right neck, and left upper abdomen. DDx includes BCC vs other.   -After discussion of benefits and risks including but not limited to bleeding, infection, scar, incomplete removal, recurrence, and non-diagnostic biopsy, written consent and photographs were obtained. The area was cleaned with  isopropyl alcohol. 1.0 mL of 1% lidocaine with epinephrine was injected to obtain adequate anesthesia of the lesion on the left clavicle, left upper abdomen, right lateral neck. Shave biopsies x 3 were performed. Hemostasis was achieved with aluminium chloride. Vaseline and a sterile dressing were applied. The patient tolerated the procedure and no complications were noted. The patient was provided with verbal and written post care instructions.     2. History of NMSC. No evidence of recurrence. Given history of multiple BCC, we discussed chemoprevention with niacinamide. Provided study for patient to review.   - Consider starting niacinamide 500 mg PO BID    3. Hx atypical nevi. No evidence of recurrence.   -ABCDEs of melanoma reviewed; sun protection advised    4. Seborrheic dermatitis, face, scalp, chest. Patient has been using Head and Shoulders.   - Recommended Neutrogena T/Gel for additional management    5. Benign findings: clinically benign nevi, cherry angiomas, solar lentigines, seborrheic keratoses  - No further intervention required. Patient to report changes.   - Patient reassured of the benign nature of these lesions.    Follow-up in 6 months, earlier for new or changing lesions.     Staff Involved:  ICorina, MS3, saw and examined the patient in the presence of Dr. Mauricio Coker.    Staff Physician:  I was present with the medical student who participated in the service and in the documentation of the note. I have verified the history and personally performed the physical exam and medical decision making. I agree with the assessment and plan of care as documented in the note.     The procedure(s) was(were) performed by myself.  Shave biopsy procedure note: After discussion of benefits and risks including but not limited to bleeding, infection, scar, incomplete removal, recurrence, and non-diagnostic biopsy, written consent and photographs were obtained. The area was cleaned with isopropyl  alcohol. 1.0 mL of 1% lidocaine with epinephrine was injected to obtain adequate anesthesia of the lesion on the left clavicle, left upper abdomen, right lateral neck. A shave biopsy was performed. Hemostasis was achieved with aluminium chloride. Vaseline and a sterile dressing were applied. The patient tolerated the procedure and no complications were noted. The patient was provided with verbal and written post care instructions.    Mauricio Coker MD  Pronouns: he/him/his    Department of Dermatology  [facility]: Phone: [phone], Fax:[phone]  [facility]: Phone: [phone] Fax: [phone]

## 2020-12-17 NOTE — PROGRESS NOTES
[facility] Dermatology Note      Dermatology Problem List:  1. NMSC              - BCC, lower anterior neck, s/p excision 3/12/20              - BCC 01/2019 L clavicle, s/p imiquimod              - BCC 02/2016 x3 from right chest, right neck, and mid back s/p ED&C              - BCC 06/2014, right cheek s/p Mohs  2. Hypertrophic scar at prior ED&C site of superficial BCC from mid back s/p biopsy 4/14/16  3. Seborrheic dermatitis  - Hydrocortisone 2.5% ointment prn   4. AKs  5. Dysplastic nevus              - Right lower back, moderate dysplasia, biopsy 6/10/14, excision 7/24/14  5. NUB x 3, s/p shave biopsies 12/17/2020   - Left central upper abdomen  - Left clavicle  - Right lateral neck    Encounter Date: Dec 17, 2020    CC:  Chief Complaint   Patient presents with     Derm Problem     Eliceo is here for a few spots of concern on head, neck, arm and abdomen.          History of Present Illness:  Mr. Eliceo Griggs is a 70 year old male who with a history of skin cancer presents skin check.    Today patient has four spots of concern. Reports his wife noticed a spot on his left occipital scalp 6 weeks ago. No itching, pain or bleeding. He has a spot on his right neck that has not healed. Reports it bleeds from rubbing on shirt collars once in a while and scabs over. No increase in size. He noticed a spot on his left forearm a couple of weeks ago. Reports it appeared suddenly. No itching, bleeding or pain. Has not been increasing in size. He noticed a bump on his right abdomen about two weeks ago with surrounding bruising. Feels like a marble under the skin. Bruising has since resolved. His son is a cardiologist and thinks it may be a sebaceous cyst or seroma. It has since decreased in size to about a quarter of what it was. No drainage or bleeding. Additionally, he has some dry patches on his right upper chest, right lower back. The incision on left anterior neck healed up well.  He has a  history of multiple BCC and a SCC on his head behind right ear about 15 years. His mother has a history of BCC. Worked as a  for years. He uses SPF50 all over when he goes outside. Otherwise healthy. Recent physical with hgb down from 14 to 12, has appointment with GI for further evaluation. No new medications.     Past Medical History:   Patient Active Problem List   Diagnosis     Dysplastic nevi     Dermatitis, seborrheic     Prostate cancer (H)     Past Medical History:   Diagnosis Date     Basal cell carcinoma      Complication of anesthesia     a long time ago during knee surgery. Patient was nauseous for 3 days after surgery     Other specified malignant neoplasm of skin of trunk, except scrotum      Prostate infection     prostatitis     Squamous cell carcinoma      Past Surgical History:   Procedure Laterality Date     ADENOIDECTOMY       BIOPSY OF SKIN LESION       LITHOTRIPSY       MOHS MICROGRAPHIC PROCEDURE       PROSTATE BIOPSY, NEEDLE, SATURATION SAMPLING       TESTICLE SURGERY       TONSILLECTOMY       VASECTOMY         Social History:  Patient reports that he has never smoked. He has never used smokeless tobacco. He reports current alcohol use. He reports that he does not use drugs.    Family History:  Family History   Problem Relation Age of Onset     Cancer Mother         skin cancer     Cancer Father      Hypertension Father      Skin Cancer No family hx of         no skin cancer       Medications:  Current Outpatient Medications   Medication Sig Dispense Refill     aspirin 81 MG tablet Take 81 mg by mouth       hydrochlorothiazide (HYDRODIURIL) 25 MG tablet Take 25 mg by mouth       Multiple Vitamin (MULTIVITAMIN  S) CAPS Take 1 capsule by mouth       rosuvastatin (CRESTOR) 20 MG tablet Take 20 mg by mouth       Allergies   Allergen Reactions     Penicillins Anaphylaxis     Erythromycin Rash     Sulfa Drugs Hives and Rash         Review of Systems:  -Constitutional: Otherwise feeling  well today, in usual state of health.  -Skin: As above in HPI. No additional skin concerns.    Physical exam:  Vitals: There were no vitals taken for this visit.  GEN: This is a well developed, well-nourished male in no acute distress, in a pleasant mood.    SKIN: Full skin, which includes the head/face, both arms, chest, back, abdomen,both legs, genitalia and/or groin buttocks, digits and/or nails, was examined.  -Small cherry red macule on the left parietal scalp.   -Erythematous plaques with greasy scale on nasal bridge and nasolabial folds.  -Erythematous papule with heme-crust right lateral neck.   -Pearly papule left upper abdomen.   -Well circumscribed pearly pink plaque measuring 1 cm in diameter to left clavicle at site of prior BCC treated with imiquimod.  -Erythematous dark brown patch with scalloped border measuring 1 cm in diameter.  -At the right lower abdomen, there is a well circumscribed pea-sized mobile nodule  -Well demarcated pigmented lesions with waxy surface and stuck on appearance affecting the face, trunk, arms.   -Multiple regular brown pigmented macules and papules are identified on the trunk and extremities.   -No other lesions of concern on areas examined.              Labs:  N/A    Impression/Plan:    1. Neoplasm of Uncertain Behavior x 3, left clavicle (previously treated with imiquimod), right neck, and left upper abdomen. DDx includes BCC vs other.   -After discussion of benefits and risks including but not limited to bleeding, infection, scar, incomplete removal, recurrence, and non-diagnostic biopsy, written consent and photographs were obtained. The area was cleaned with isopropyl alcohol. 1.0 mL of 1% lidocaine with epinephrine was injected to obtain adequate anesthesia of the lesion on the left clavicle, left upper abdomen, right lateral neck. Shave biopsies x 3 were performed. Hemostasis was achieved with aluminium chloride. Vaseline and a sterile dressing were applied. The  patient tolerated the procedure and no complications were noted. The patient was provided with verbal and written post care instructions.     2. History of NMSC. No evidence of recurrence. Given history of multiple BCC, we discussed chemoprevention with niacinamide. Provided study for patient to review.   - Consider starting niacinamide 500 mg PO BID    3. Hx atypical nevi. No evidence of recurrence.   -ABCDEs of melanoma reviewed; sun protection advised    4. Seborrheic dermatitis, face, scalp, chest. Patient has been using Head and Shoulders.   - Recommended Neutrogena T/Gel for additional management    5. Benign findings: clinically benign nevi, cherry angiomas, solar lentigines, seborrheic keratoses  - No further intervention required. Patient to report changes.   - Patient reassured of the benign nature of these lesions.    Follow-up in 6 months, earlier for new or changing lesions.     Staff Involved:  Corina VALERIO, MS3, saw and examined the patient in the presence of Dr. Mauricio Coker.    Staff Physician:  I was present with the medical student who participated in the service and in the documentation of the note. I have verified the history and personally performed the physical exam and medical decision making. I agree with the assessment and plan of care as documented in the note.     The procedure(s) was(were) performed by myself.  Shave biopsy procedure note: After discussion of benefits and risks including but not limited to bleeding, infection, scar, incomplete removal, recurrence, and non-diagnostic biopsy, written consent and photographs were obtained. The area was cleaned with isopropyl alcohol. 1.0 mL of 1% lidocaine with epinephrine was injected to obtain adequate anesthesia of the lesion on the left clavicle, left upper abdomen, right lateral neck. A shave biopsy was performed. Hemostasis was achieved with aluminium chloride. Vaseline and a sterile dressing were applied. The patient tolerated  the procedure and no complications were noted. The patient was provided with verbal and written post care instructions.    Mauricio Coker MD  Pronouns: he/him/his    Department of Dermatology  [facility]: Phone: [phone], Fax:[phone]  [facility]: Phone: [phone] Fax: [phone]

## 2020-12-17 NOTE — NURSING NOTE
Lidocaine-epinephrine 1-1:742109 % injection   1.5mL once for one use, starting 12/17/2020 ending 12/17/2020,  2mL disp, R-0, injection  Injected by Vania Hoover CMA

## 2020-12-17 NOTE — PATIENT INSTRUCTIONS

## 2020-12-17 NOTE — NURSING NOTE
Chief Complaint   Patient presents with     Derm Problem     Eliceo is here for a few spots of concern on head, neck, arm and abdomen.      Samantha Grier LPN

## 2020-12-21 LAB — COPATH REPORT: NORMAL

## 2020-12-22 NOTE — RESULT ENCOUNTER NOTE
Can we call patient and let him know    1. Left clavicle - BCC - will need excision  2. Right lateral neck - BCC - will need MMS  3. Left upper abdomen - BCC - will need excision    Could you schedule for MMS x 1 and excision x 2 as above and place derm surg referral. He should otherwise follow-up with me in June as planned. Thanks!    Mauricio Coker MD  Pronouns: he/him/his    Department of Dermatology  [facility]: Phone: [phone], Fax:[phone]  [facility]: Phone: [phone] Fax: [phone]

## 2021-01-16 ENCOUNTER — TELEPHONE (OUTPATIENT)
Dept: DERMATOLOGY | Facility: CLINIC | Age: 71
End: 2021-01-16

## 2021-04-10 ENCOUNTER — HEALTH MAINTENANCE LETTER (OUTPATIENT)
Age: 71
End: 2021-04-10

## 2021-05-25 ENCOUNTER — RECORDS - HEALTHEAST (OUTPATIENT)
Dept: ADMINISTRATIVE | Facility: CLINIC | Age: 71
End: 2021-05-25

## 2021-05-26 ENCOUNTER — RECORDS - HEALTHEAST (OUTPATIENT)
Dept: ADMINISTRATIVE | Facility: CLINIC | Age: 71
End: 2021-05-26

## 2021-05-28 ENCOUNTER — RECORDS - HEALTHEAST (OUTPATIENT)
Dept: ADMINISTRATIVE | Facility: CLINIC | Age: 71
End: 2021-05-28

## 2021-05-30 ENCOUNTER — RECORDS - HEALTHEAST (OUTPATIENT)
Dept: ADMINISTRATIVE | Facility: CLINIC | Age: 71
End: 2021-05-30

## 2021-05-31 ENCOUNTER — RECORDS - HEALTHEAST (OUTPATIENT)
Dept: ADMINISTRATIVE | Facility: CLINIC | Age: 71
End: 2021-05-31

## 2021-06-07 NOTE — TELEPHONE ENCOUNTER
FUTURE VISIT INFORMATION      FUTURE VISIT INFORMATION:    Date: 6.8.21    Time: 8:30    Location: [facility]  REFERRAL INFORMATION:    Referring provider:  Dr. Coker    Referring providers clinic:  [facility]    Reason for visit/diagnosis  BCC- MMS- R Lateral Neck, BCCX2- Excision- Left clavicle& Left upper abdomen    RECORDS REQUESTED FROM:       Clinic name Comments Records Status Photos Status   [facility] 12.17.20  Path # [id number] [facility] Epic

## 2021-06-08 ENCOUNTER — OFFICE VISIT (OUTPATIENT)
Dept: DERMATOLOGY | Facility: CLINIC | Age: 71
End: 2021-06-08
Payer: COMMERCIAL

## 2021-06-08 ENCOUNTER — PRE VISIT (OUTPATIENT)
Dept: DERMATOLOGY | Facility: CLINIC | Age: 71
End: 2021-06-08

## 2021-06-08 VITALS — SYSTOLIC BLOOD PRESSURE: 144 MMHG | DIASTOLIC BLOOD PRESSURE: 86 MMHG | HEART RATE: 75 BPM

## 2021-06-08 DIAGNOSIS — C44.519 BASAL CELL CARCINOMA OF ABDOMEN: ICD-10-CM

## 2021-06-08 DIAGNOSIS — D48.5 NEOPLASM OF UNCERTAIN BEHAVIOR OF SKIN: ICD-10-CM

## 2021-06-08 DIAGNOSIS — L57.0 ACTINIC KERATOSIS: ICD-10-CM

## 2021-06-08 DIAGNOSIS — C44.41 BASAL CELL CARCINOMA (BCC) OF NECK: ICD-10-CM

## 2021-06-08 DIAGNOSIS — C44.519 BCC (BASAL CELL CARCINOMA), TRUNK: Primary | ICD-10-CM

## 2021-06-08 PROCEDURE — 17000 DESTRUCT PREMALG LESION: CPT | Mod: XS | Performed by: DERMATOLOGY

## 2021-06-08 PROCEDURE — 88305 TISSUE EXAM BY PATHOLOGIST: CPT | Performed by: DERMATOLOGY

## 2021-06-08 PROCEDURE — 17311 MOHS 1 STAGE H/N/HF/G: CPT | Mod: GC | Performed by: DERMATOLOGY

## 2021-06-08 PROCEDURE — 99213 OFFICE O/P EST LOW 20 MIN: CPT | Mod: 25 | Performed by: DERMATOLOGY

## 2021-06-08 PROCEDURE — 11602 EXC TR-EXT MAL+MARG 1.1-2 CM: CPT | Mod: XS | Performed by: DERMATOLOGY

## 2021-06-08 PROCEDURE — 12042 INTMD RPR N-HF/GENIT2.6-7.5: CPT | Mod: GC | Performed by: DERMATOLOGY

## 2021-06-08 PROCEDURE — 12034 INTMD RPR S/TR/EXT 7.6-12.5: CPT | Mod: XS | Performed by: DERMATOLOGY

## 2021-06-08 RX ORDER — FERROUS SULFATE 325(65) MG
TABLET ORAL
COMMUNITY
Start: 2021-04-22

## 2021-06-08 ASSESSMENT — PAIN SCALES - GENERAL: PAINLEVEL: NO PAIN (0)

## 2021-06-08 NOTE — PROGRESS NOTES
[facility] Dermatology Note  Encounter Date: Jun 8, 2021  Office Visit     Dermatology Problem List:  1. NMSC              - BCC Left central upper abdomen s/p excision 6/8/21              - BCC Left clavicle s/p excision 6/8/21              - BCC  Right lateral neck s/p MMS 6/8/21              - BCC, lower anterior neck, s/p excision 3/12/20              - BCC 01/2019 L clavicle, s/p imiquimod              - BCC 02/2016 x3 from right chest, right neck, and mid back s/p ED&C              - BCC 06/2014, right cheek s/p Mohs  2. Hypertrophic scar at prior ED&C site of superficial BCC from mid back s/p biopsy 4/14/16  3. Seborrheic dermatitis  - Hydrocortisone 2.5% ointment prn   4. AKs  5. Dysplastic nevus              - Right lower back, moderate dysplasia, biopsy 6/10/14, excision 7/24/141. NMSC              - BCC Left central upper abdomen s/p excision 6/8/21              - BCC Left clavicle s/p excision 6/8/21              - BCC  Right lateral neck s/p MMS 6/8/21              - BCC, lower anterior neck, s/p excision 3/12/20              - BCC 01/2019 L clavicle, s/p imiquimod              - BCC 02/2016 x3 from right chest, right neck, and mid back s/p ED&C              - BCC 06/2014, right cheek s/p Mohs  2. Hypertrophic scar at prior ED&C site of superficial BCC from mid back s/p biopsy 4/14/16  3. Seborrheic dermatitis  - Hydrocortisone 2.5% ointment prn   4. AKs  5. Dysplastic nevus              - Right lower back, moderate dysplasia, biopsy 6/10/14, excision 7/24/14    ____________________________________________    Assessment & Plan:     # AK, crown of scalp  Discussed etiology and treatment options   - Cryotherapy performed today     # Hx NMSC.   - NERD   - Counseled on sun protective behaviors and encouraged daily use of sunscreen    # Benign skin findings: SK's, lentigos, clinically benign nevi  - Counseled patient on benign nature  - Patient to report changes    Procedures Performed:    - Cryotherapy procedure note, location(s): crown of scalp. After verbal consent and discussion of risks and benefits including, but not limited to, dyspigmentation/scar, blister, and pain, x1 lesion(s) was(were) treated with 1-2 mm freeze border for 1-2 cycles with liquid nitrogen. Post cryotherapy instructions were provided.    Follow-up: 6 month(s) in-person, or earlier for new or changing lesions    Patient seen and examined with Dr. Perkins    Staff and Resident:     Sagrario Joseph PGY3  Dermatology Resident  pager        Staff Physician Comments:   I saw and evaluated the patient with the resident and I agree with the assessment and plan. I was present for the entire procedure and examination.    Wilber Perkins DO    Department of Dermatology  [facility]: Phone: [phone], Fax:[phone]  [facility]: Phone: [phone], Fax: [phone]    ____________________________________________    CC: Derm Problem (BCC- MMS- R Lateral Neck, BCCX2- Excision- Left clavicle& Left upper abdomen)    HPI:  Mr. Eliceo Griggs is a(n) 71 year old male who presents today as a return patient for skin check following derm surgery appointment. Patient notes scaly spot on scalp that is asymptomatic.     Denies any bleeding, tender or non healing leasions    Patient is otherwise feeling well, without additional skin concerns.    Labs Reviewed:  N/A    Physical Exam:  Vitals: BP (!) 144/86   Pulse 75   SKIN: Total skin excluding the undergarment areas was performed. The exam included the head/face, neck, both arms, chest, back, abdomen, both legs, digits and/or nails.   - gritty pink papule on crown of scalp  - Scattered brown macules on sun exposed areas..   - There is no erythema, telangectasias, nodularity, or pigmentation at prior skin cancer sites..   - There are waxy stuck on tan to brown  papules on the trunk and extremities   - No other lesions of concern on areas examined.     Medications:  Current Outpatient Medications   Medication     aspirin 81 MG tablet     ferrous sulfate (FEROSUL) 325 (65 Fe) MG tablet     hydrochlorothiazide (HYDRODIURIL) 25 MG tablet     Multiple Vitamin (MULTIVITAMIN  S) CAPS     rosuvastatin (CRESTOR) 20 MG tablet     No current facility-administered medications for this visit.       Past Medical History:   Patient Active Problem List   Diagnosis     Dysplastic nevi     Dermatitis, seborrheic     Prostate cancer (H)     Past Medical History:   Diagnosis Date     Basal cell carcinoma      Complication of anesthesia     a long time ago during knee surgery. Patient was nauseous for 3 days after surgery     Other specified malignant neoplasm of skin of trunk, except scrotum      Prostate infection     prostatitis     Squamous cell carcinoma        CC Mauricio Coker MD  [address] [address] on close of this encounter.

## 2021-06-08 NOTE — PROGRESS NOTES
[facility] [place] Procedure Note    Dermatology Problem List:  1. NMSC   - BCC Left central upper abdomen s/p excision 6/8/21   - BCC Left clavicle s/p excision 6/8/21   - BCC  Right lateral neck s/p MMS 6/8/21              - BCC, lower anterior neck, s/p excision 3/12/20              - BCC 01/2019 L clavicle, s/p imiquimod              - BCC 02/2016 x3 from right chest, right neck, and mid back s/p ED&C              - BCC 06/2014, right cheek s/p Mohs  2. Hypertrophic scar at prior ED&C site of superficial BCC from mid back s/p biopsy 4/14/16  3. Seborrheic dermatitis  - Hydrocortisone 2.5% ointment prn   4. AKs  5. Dysplastic nevus              - Right lower back, moderate dysplasia, biopsy 6/10/14, excision 7/24/14    Date of Service:  Jun 8, 2021  Surgery: Mohs micrographic surgery    Case 1  Repair Type: linear intermeditate  Repair Size: 3.4  cm  Suture Material: monocryl, fast gut  Tumor Type: BCC  Location: right lateral neck  Derm-Path Accession #: [id number]  PreOp Size: 1.8 x 1.0 cm  PostOp Size: 2.8 x 1.8 cm  Mohs Accession #:   Level of Defect: subcutaneous fat      Procedure:  We discussed the principles of treatment and most likely complications including scarring, bleeding, infection, swelling, pain, crusting, nerve damage, large wound,  incomplete excision, wound dehiscence,  nerve damage, recurrence, and a second procedure may be recommended to obtain the best cosmetic or functional result.    Informed consent was obtained and the patient underwent the procedure as follows:  The patient was placed supine on the operating table.  The cancer was identified, outlined with a marker, and verified by the patient.  The entire surgical field was prepped with Chlorhexidine .The surgical site was anesthetized using lidocaine with epinephrine.      The area of clinically apparent tumor was not debulked. The layer of tissue was then surgically excised using a #15  blade and was then transferred onto a specimen sheet maintaining the orientation of the specimen. Hemostasis was obtained using electrocautery. The wound site was then covered with a dressing while the tissue samples were processed for examination.    The excised tissue was transported to the Mohs histology laboratory maintaining the tissue orientation.  The tissue specimen was relaxed so that the entire surgical margin was in a a single horizontal plane for sectioning and inked for precise mapping.  A precise reference map was drawn to reflect the sectioning of the specimen, colored inking of the margins, and orientation on the patient. The tissue was processed using horizontal sectioning of the base and continuous peripheral margins.  The histopathologic sections were reviewed in conjunction with the reference map.    Total blocks: 1    Total slides:  3    There were no cancer cells visualized on examination, therefore Mohs surgery was complete.    Reconstruction: Intermediate Linear Closure      The patient was taken to the operative suite and placed supine on the operating room table.  The defect was identified.  Appropriate markings were made with a marking pen to plan the repair.  The area was infiltrated with Lidocaine 1% with epi 1:100,000 and prepped with Hibiclens and draped with sterile towels.     The wound was debeveled and undermined widely.  Cones were excised within relaxed skin tension lines on both sides of the defect.  Hemostasis was obtained using electrofulguration.  Subcutaneous tissues were then approximated using 4.0 Monocrcyl buried vertical mattress sutures.  The wound edges were then approximated additional  buried sutures were placed in a similar fashion where needed.  Subcutneous simple running 5.0 fast gut sutures were carefully placed for maximum eversion and meticulous approximation.    DERMATOLOGY EXCISION PROCEDURE NOTE    NAME OF PROCEDURE: Excision intermediate layered linear  closure  Staff surgeon:  Dr. Wilber Perkins  Resident: Dr. Sagrario Joseph  Scrub Nurse: Katharine Longo    PRE-OPERATIVE DIAGNOSIS:  Basal Cell Carcinoma  POST-OPERATIVE DIAGNOSIS: Same   LOCATION: left clavicle  FINAL EXCISION SIZE(DEFECT SIZE): 1.8 x 1.0 cm  MARGIN: 0.4 cm  FINAL REPAIR LENGTH: 3.5 cm   ANESTHESIA:  1% lidocaine with 1:100,000 epinephrine    INDICATIONS: This patient presented with a  0.9 cm Basal Cell Carcinoma. Excision was indicated. We discussed the principles of treatment and most likely complications including scarring, bleeding, infection, incomplete excision, wound dehiscence, pain, nerve damage, and recurrence. Informed consent was obtained and the patient underwent the procedure as follows:    PROCEDURE: The patient was taken to the operative suite. Time-out was performed.  The treatment area was anesthetized with 1% lidocaine with epinephrine. The area was prepped with Chlorhexidine and rinsed with sterile saline and draped with sterile towels. The lesion was delineated and excised down to subcutaneous fat in a elliptical manner. Hemostasis was obtained by electrocoagulation.     REPAIR: An intermediate layered linear closure was selected as the procedure which would maximally preserve both function and cosmesis.    After the excision of the tumor, the area was carefully  undermined. Hemostasis was obtained with  electrocoagulation.  Closure was oriented so that the wound was in the patient's natural skin tension lines. The subcutaneous and dermal layers were then closed with 4-0 monocryl sutures. The epidermis was then carefully approximated along the length of the wound using 4-0 monocryl sutures using running subcuticular technique    Estimated blood loss was less than 10 ml for all surgical sites. A sterile pressure dressing was applied and wound care instructions, with a written handout, were given. The patient was discharged from the [facility] alert and  ambulatory..    Anatomic Pathology Results: pending      Repair Size: 3.5 cm    The wound was cleansed with saline and ointment was applied along the wound surface.     A sterile pressure dressing was applied.  Wound care instructions were given verbally and in writing.  The patient left the operating suite in stable condition.  Patient was informed that additional refinement of the resulting surgical scar may be used as a second stage of this reconstruction.     The attending surgeon was present for entire procedure    DERMATOLOGY EXCISION PROCEDURE NOTE      NAME OF PROCEDURE: Excision intermediate layered linear closure  Staff surgeon: Dr. Wilber Perkins  Resident: Dr. Sagrario Joseph  Scrub Nurse: Katharine Longo    PRE-OPERATIVE DIAGNOSIS:  Basal Cell Carcinoma  POST-OPERATIVE DIAGNOSIS: Same   LOCATION: left upper abdomen  FINAL EXCISION SIZE(DEFECT SIZE): 1.8 x 1.0 cm  MARGIN: 0.4 cm  FINAL REPAIR LENGTH: 4.2 cm   ANESTHESIA: 1% lidocaine with 1:100,000 epinephrine           INDICATIONS: This patient presented with a 1.0 cm Basal Cell Carcinoma. Excision was indicated. We discussed the principles of treatment and most likely complications including scarring, bleeding, infection, incomplete excision, wound dehiscence, pain, nerve damage, and recurrence. Informed consent was obtained and the patient underwent the procedure as follows:    PROCEDURE: The patient was taken to the operative suite. Time-out was performed.  The treatment area was anesthetized with 1% lidocaine with epinephrine. The area was prepped with Chlorhexidine and rinsed with sterile saline and draped with sterile towels. The lesion was delineated and excised down to subcutaneous fat in a elliptical manner. Hemostasis was obtained by electrocoagulation.     REPAIR: An intermediate layered linear closure was selected as the procedure which would maximally preserve both function and cosmesis.    After the excision of the tumor, the area was carefully   undermined. Hemostasis was obtained with   electrocoagulation.  Closure was oriented so that the wound was in the patient's natural skin tension lines. The subcutaneous and dermal layers were then closed with 4-0 monocryl sutures. The epidermis was then carefully approximated along the length of the wound using 5-0 fast gut simple running closure    Estimated blood loss was less than 10 ml for all surgical sites. A sterile pressure dressing was applied and wound care instructions, with a written handout, were given. The patient was discharged from the [facility] Surgery Center alert and ambulatory.lClare Perkins was immediately available for the entire surgery and was physicially present for the key portions of the procedure.    Anatomic Pathology Results: pending    Clinical Follow-Up: 6 months     Staff Involved:  Resident/Staff     Sagrario Joseph PGY3  Dermatology Resident  pager      Staff Physician Comments:   I saw and evaluated the patient with the resident and I agree with the assessment and plan. I was present for the key portions of the above major procedure and examination.    Wilber Perkins DO    Department of Dermatology  [facility]: Phone: [phone], Fax:[phone]  [facility]: Phone: [phone], Fax: [phone]

## 2021-06-08 NOTE — LETTER
6/8/2021       RE: Eliceo Griggs  [address] [place] [address]     Dear Colleague,    Thank you for referring your patient, Eliceo Griggs, to the [facility] DERMATOLOGIC SURGERY CLINIC [place] at [facility]. Please see a copy of my visit note below.    [facility] Dermatologic Surgery Clinic [place] Procedure Note    Dermatology Problem List:  1. NMSC   - BCC Left central upper abdomen s/p excision 6/8/21   - BCC Left clavicle s/p excision 6/8/21   - BCC  Right lateral neck s/p MMS 6/8/21              - BCC, lower anterior neck, s/p excision 3/12/20              - BCC 01/2019 L clavicle, s/p imiquimod              - BCC 02/2016 x3 from right chest, right neck, and mid back s/p ED&C              - BCC 06/2014, right cheek s/p Mohs  2. Hypertrophic scar at prior ED&C site of superficial BCC from mid back s/p biopsy 4/14/16  3. Seborrheic dermatitis  - Hydrocortisone 2.5% ointment prn   4. AKs  5. Dysplastic nevus              - Right lower back, moderate dysplasia, biopsy 6/10/14, excision 7/24/14    Date of Service:  Jun 8, 2021  Surgery: Mohs micrographic surgery    Case 1  Repair Type: linear intermeditate  Repair Size: 3.4  cm  Suture Material: monocryl, fast gut  Tumor Type: BCC  Location: right lateral neck  Derm-Path Accession #: [id number]  PreOp Size: 1.8 x 1.0 cm  PostOp Size: 2.8 x 1.8 cm  Mohs Accession #:   Level of Defect: subcutaneous fat      Procedure:  We discussed the principles of treatment and most likely complications including scarring, bleeding, infection, swelling, pain, crusting, nerve damage, large wound,  incomplete excision, wound dehiscence,  nerve damage, recurrence, and a second procedure may be recommended to obtain the best cosmetic or functional result.    Informed consent was obtained and the patient underwent the procedure as follows:  The patient was placed supine on the operating table.  The  cancer was identified, outlined with a marker, and verified by the patient.  The entire surgical field was prepped with Chlorhexidine .The surgical site was anesthetized using lidocaine with epinephrine.      The area of clinically apparent tumor was not debulked. The layer of tissue was then surgically excised using a #15 blade and was then transferred onto a specimen sheet maintaining the orientation of the specimen. Hemostasis was obtained using electrocautery. The wound site was then covered with a dressing while the tissue samples were processed for examination.    The excised tissue was transported to the Mohs histology laboratory maintaining the tissue orientation.  The tissue specimen was relaxed so that the entire surgical margin was in a a single horizontal plane for sectioning and inked for precise mapping.  A precise reference map was drawn to reflect the sectioning of the specimen, colored inking of the margins, and orientation on the patient. The tissue was processed using horizontal sectioning of the base and continuous peripheral margins.  The histopathologic sections were reviewed in conjunction with the reference map.    Total blocks: 1    Total slides:  3    There were no cancer cells visualized on examination, therefore Mohs surgery was complete.    Reconstruction: Intermediate Linear Closure      The patient was taken to the operative suite and placed supine on the operating room table.  The defect was identified.  Appropriate markings were made with a marking pen to plan the repair.  The area was infiltrated with Lidocaine 1% with epi 1:100,000 and prepped with Hibiclens and draped with sterile towels.     The wound was debeveled and undermined widely.  Cones were excised within relaxed skin tension lines on both sides of the defect.  Hemostasis was obtained using electrofulguration.  Subcutaneous tissues were then approximated using 4.0 Monocrcyl buried vertical mattress sutures.  The wound  edges were then approximated additional  buried sutures were placed in a similar fashion where needed.  Subcutneous simple running 5.0 fast gut sutures were carefully placed for maximum eversion and meticulous approximation.    DERMATOLOGY EXCISION PROCEDURE NOTE    NAME OF PROCEDURE: Excision intermediate layered linear closure  Staff surgeon:  Dr. Wilber Perkins  Resident: Dr. Sagrario Joseph  Scrub Nurse: Katharine Longo    PRE-OPERATIVE DIAGNOSIS:  Basal Cell Carcinoma  POST-OPERATIVE DIAGNOSIS: Same   LOCATION: left clavicle  FINAL EXCISION SIZE(DEFECT SIZE): 1.8 x 1.0 cm  MARGIN: 0.4 cm  FINAL REPAIR LENGTH: 3.5 cm   ANESTHESIA:  1% lidocaine with 1:100,000 epinephrine    INDICATIONS: This patient presented with a  0.9 cm Basal Cell Carcinoma. Excision was indicated. We discussed the principles of treatment and most likely complications including scarring, bleeding, infection, incomplete excision, wound dehiscence, pain, nerve damage, and recurrence. Informed consent was obtained and the patient underwent the procedure as follows:    PROCEDURE: The patient was taken to the operative suite. Time-out was performed.  The treatment area was anesthetized with 1% lidocaine with epinephrine. The area was prepped with Chlorhexidine and rinsed with sterile saline and draped with sterile towels. The lesion was delineated and excised down to subcutaneous fat in a elliptical manner. Hemostasis was obtained by electrocoagulation.     REPAIR: An intermediate layered linear closure was selected as the procedure which would maximally preserve both function and cosmesis.    After the excision of the tumor, the area was carefully  undermined. Hemostasis was obtained with  electrocoagulation.  Closure was oriented so that the wound was in the patient's natural skin tension lines. The subcutaneous and dermal layers were then closed with 4-0 monocryl sutures. The epidermis was then carefully approximated along the length of the wound  using 4-0 monocryl sutures using running subcuticular technique    Estimated blood loss was less than 10 ml for all surgical sites. A sterile pressure dressing was applied and wound care instructions, with a written handout, were given. The patient was discharged from the Dermatologic Surgery Center alert and ambulatory..    Anatomic Pathology Results: pending      Repair Size: 3.5 cm    The wound was cleansed with saline and ointment was applied along the wound surface.     A sterile pressure dressing was applied.  Wound care instructions were given verbally and in writing.  The patient left the operating suite in stable condition.  Patient was informed that additional refinement of the resulting surgical scar may be used as a second stage of this reconstruction.     The attending surgeon was present for entire procedure    DERMATOLOGY EXCISION PROCEDURE NOTE      NAME OF PROCEDURE: Excision intermediate layered linear closure  Staff surgeon: Dr. Wilber Perkins  Resident: Dr. Sagrario Joseph  Scrub Nurse: Katharine Longo    PRE-OPERATIVE DIAGNOSIS:  Basal Cell Carcinoma  POST-OPERATIVE DIAGNOSIS: Same   LOCATION: left upper abdomen  FINAL EXCISION SIZE(DEFECT SIZE): 1.8 x 1.0 cm  MARGIN: 0.4 cm  FINAL REPAIR LENGTH: 4.2 cm   ANESTHESIA: 1% lidocaine with 1:100,000 epinephrine           INDICATIONS: This patient presented with a 1.0 cm Basal Cell Carcinoma. Excision was indicated. We discussed the principles of treatment and most likely complications including scarring, bleeding, infection, incomplete excision, wound dehiscence, pain, nerve damage, and recurrence. Informed consent was obtained and the patient underwent the procedure as follows:    PROCEDURE: The patient was taken to the operative suite. Time-out was performed.  The treatment area was anesthetized with 1% lidocaine with epinephrine. The area was prepped with Chlorhexidine and rinsed with sterile saline and draped with sterile towels. The lesion was  delineated and excised down to subcutaneous fat in a elliptical manner. Hemostasis was obtained by electrocoagulation.     REPAIR: An intermediate layered linear closure was selected as the procedure which would maximally preserve both function and cosmesis.    After the excision of the tumor, the area was carefully  undermined. Hemostasis was obtained with   electrocoagulation.  Closure was oriented so that the wound was in the patient's natural skin tension lines. The subcutaneous and dermal layers were then closed with 4-0 monocryl sutures. The epidermis was then carefully approximated along the length of the wound using 5-0 fast gut simple running closure    Estimated blood loss was less than 10 ml for all surgical sites. A sterile pressure dressing was applied and wound care instructions, with a written handout, were given. The patient was discharged from the Dermatologic Surgery Center alert and ambulatory.l.     Dr. Perkins was immediately available for the entire surgery and was physicially present for the key portions of the procedure.    Anatomic Pathology Results: pending    Clinical Follow-Up: 6 months     Staff Involved:  Resident/Staff     Sagrario Joseph PGY3  Dermatology Resident  pager      Staff Physician Comments:   I saw and evaluated the patient with the resident and I agree with the assessment and plan. I was present for the key portions of the above major procedure and examination.    Wilber Perkins DO    Department of Dermatology  [facility]: Phone: [phone], Fax:[phone]  [facility]: Phone: [phone], Fax: [phone]      [facility] Dermatology Note  Encounter Date: Jun 8, 2021  Office Visit     Dermatology Problem List:  1. NMSC              - BCC Left central upper abdomen s/p excision 6/8/21              - BCC Left clavicle  s/p excision 6/8/21              - BCC  Right lateral neck s/p MMS 6/8/21              - BCC, lower anterior neck, s/p excision 3/12/20              - BCC 01/2019 L clavicle, s/p imiquimod              - BCC 02/2016 x3 from right chest, right neck, and mid back s/p ED&C              - BCC 06/2014, right cheek s/p Mohs  2. Hypertrophic scar at prior ED&C site of superficial BCC from mid back s/p biopsy 4/14/16  3. Seborrheic dermatitis  - Hydrocortisone 2.5% ointment prn   4. AKs  5. Dysplastic nevus              - Right lower back, moderate dysplasia, biopsy 6/10/14, excision 7/24/141. NMSC              - BCC Left central upper abdomen s/p excision 6/8/21              - BCC Left clavicle s/p excision 6/8/21              - BCC  Right lateral neck s/p MMS 6/8/21              - BCC, lower anterior neck, s/p excision 3/12/20              - BCC 01/2019 L clavicle, s/p imiquimod              - BCC 02/2016 x3 from right chest, right neck, and mid back s/p ED&C              - BCC 06/2014, right cheek s/p Mohs  2. Hypertrophic scar at prior ED&C site of superficial BCC from mid back s/p biopsy 4/14/16  3. Seborrheic dermatitis  - Hydrocortisone 2.5% ointment prn   4. AKs  5. Dysplastic nevus              - Right lower back, moderate dysplasia, biopsy 6/10/14, excision 7/24/14    ____________________________________________    Assessment & Plan:     # AK, crown of scalp  Discussed etiology and treatment options   - Cryotherapy performed today     # Hx NMSC.   - NERD   - Counseled on sun protective behaviors and encouraged daily use of sunscreen    # Benign skin findings: SK's, lentigos, clinically benign nevi  - Counseled patient on benign nature  - Patient to report changes    Procedures Performed:   - Cryotherapy procedure note, location(s): crown of scalp. After verbal consent and discussion of risks and benefits including, but not limited to, dyspigmentation/scar, blister, and pain, x1 lesion(s) was(were) treated with 1-2  mm freeze border for 1-2 cycles with liquid nitrogen. Post cryotherapy instructions were provided.    Follow-up: 6 month(s) in-person, or earlier for new or changing lesions    Patient seen and examined with Dr. Perkins    Staff and Resident:     Sagrario Joseph PGY3  Dermatology Resident  pager        Staff Physician Comments:   I saw and evaluated the patient with the resident and I agree with the assessment and plan. I was present for the entire procedure and examination.    Wilber Perkins DO    Department of Dermatology  [facility]: Phone: [phone], Fax:[phone]  [facility]: Phone: [phone], Fax: [phone]    ____________________________________________    CC: Derm Problem (BCC- MMS- R Lateral Neck, BCCX2- Excision- Left clavicle& Left upper abdomen)    HPI:  Mr. Eliceo Griggs is a(n) 71 year old male who presents today as a return patient for skin check following derm surgery appointment. Patient notes scaly spot on scalp that is asymptomatic.     Denies any bleeding, tender or non healing leasions    Patient is otherwise feeling well, without additional skin concerns.    Labs Reviewed:  N/A    Physical Exam:  Vitals: BP (!) 144/86   Pulse 75   SKIN: Total skin excluding the undergarment areas was performed. The exam included the head/face, neck, both arms, chest, back, abdomen, both legs, digits and/or nails.   - gritty pink papule on crown of scalp  - Scattered brown macules on sun exposed areas..   - There is no erythema, telangectasias, nodularity, or pigmentation at prior skin cancer sites..   - There are waxy stuck on tan to brown papules on the trunk and extremities   - No other lesions of concern on areas examined.     Medications:  Current Outpatient Medications   Medication     aspirin 81 MG tablet     ferrous sulfate (FEROSUL) 325 (65 Fe) MG tablet      hydrochlorothiazide (HYDRODIURIL) 25 MG tablet     Multiple Vitamin (MULTIVITAMIN  S) CAPS     rosuvastatin (CRESTOR) 20 MG tablet     No current facility-administered medications for this visit.       Past Medical History:   Patient Active Problem List   Diagnosis     Dysplastic nevi     Dermatitis, seborrheic     Prostate cancer (H)     Past Medical History:   Diagnosis Date     Basal cell carcinoma      Complication of anesthesia     a long time ago during knee surgery. Patient was nauseous for 3 days after surgery     Other specified malignant neoplasm of skin of trunk, except scrotum      Prostate infection     prostatitis     Squamous cell carcinoma        CC Mauricio Coker MD  [address] [address] on close of this encounter.

## 2021-06-08 NOTE — PATIENT INSTRUCTIONS
Wound Care Instructions  I will experience scar, altered skin color, bleeding, swelling, pain, crusting and redness. I may experience altered sensation. Risks are excessive bleeding, infection, muscle weakness, thick (hypertrophic or keloidal) scar, and recurrence,. A second procedure may be recommended to obtain the best cosmetic or functional result.  Possible complications of any surgical procedure are bleeding, infection, scarring, alteration in skin color and sensation, muscle weakness in the area, wound dehiscence or seperation, or recurrence of the lesion or disease. On occasion, after healing, a secondary procedure or revision may be recommended in order to obtain the best cosmetic or functional result.   After your surgery, a pressure bandage will be placed over the area that has sutures. This will help prevent bleeding.   For the First 48 hours After Surgery:  1. Leave the pressure bandage on and keep it dry. If it should come loose, you may retape it, but do not take it off.  2. Relax and take it easy. Do not do any vigorous exercise, heavy lifting, or bending forward. This could cause the wound to bleed.  3. Post-operative pain is usually mild. You may take plain or extra strength Tylenol every 4 hours as needed (do not take more than 4,000mg in one day). Do not take any medicine that contains aspirin, ibuprofen or motrin unless you have been recommended these by a doctor.  Avoid alcohol and vitamin E as these may increase your tendency to bleed.  4. You may put an ice pack around the bandaged area for 20 minutes every 2-3 hours. This may help reduce swelling, bruising, and pain. Make sure the ice pack is waterproof so that the pressure bandage does not get wet.   5. You may see a small amount of drainage or blood on your pressure bandage. This is normal. However, if drainage or bleeding continues or saturates the bandage, you will need to apply firm pressure over the bandage with a washcloth for 15  minutes. If bleeding continues after applying pressure for 15 minutes then go to the nearest emergency room.  48 Hours After Surgery  Carefully remove the bandage and start daily wound care and dressing changes. You may also now shower and get the wound wet. Wash wound with a mild soap and water.  Use caution when washing the wound. Be gentle and do not let the forceful shower stream hit the wound directly.  PAT dry.  Daily Wound Care:  1. Wash wound with a mild soap and water.  Use caution when washing the wound, be gentle and do not let the forceful shower stream hit the wound directly.  2. PAT DRY.  3. Apply Vaseline (from a new container or tube) over the suture line with a Q-tip. It is very important to keep the wound continuously moist, as wounds heal best in a moist environment.  4.  Keep the site covered until sutures are removed, you can cover it with a Telfa (non-stick) dressing and tape or a band-aid.    5. If you are unable to keep wound covered, you must apply Vaseline every 2 - 3 hours (while awake) to ensure it is being kept moist for optimal healing. A dressing overnight is recommended to keep the area moist.   Call Us If:  1. You have pain that is not controlled with Tylenol.  2. You have signs or symptoms of an infection, such as: fever over 100 degrees F, redness, warmth, or foul-smelling or yellow/creamy drainage from the wound.  Who should I call with questions?    [facility]: [phone]     [facility]: [phone]    For urgent needs outside of business hours call the [facility] at [phone] and ask for the dermatology resident on call

## 2021-06-10 ENCOUNTER — MYC MEDICAL ADVICE (OUTPATIENT)
Dept: DERMATOLOGY | Facility: CLINIC | Age: 71
End: 2021-06-10

## 2021-06-10 LAB — COPATH REPORT: NORMAL

## 2021-06-21 NOTE — TELEPHONE ENCOUNTER
Called and informed patient of results. Patient verbalized understanding and had no further questions or concerns at this time.     Sandi Hassan LPN

## 2021-09-19 ENCOUNTER — HEALTH MAINTENANCE LETTER (OUTPATIENT)
Age: 71
End: 2021-09-19

## 2022-05-01 ENCOUNTER — HEALTH MAINTENANCE LETTER (OUTPATIENT)
Age: 72
End: 2022-05-01

## 2022-10-11 ENCOUNTER — OFFICE VISIT (OUTPATIENT)
Dept: DERMATOLOGY | Facility: CLINIC | Age: 72
End: 2022-10-11
Payer: COMMERCIAL

## 2022-10-11 DIAGNOSIS — Z85.828 HISTORY OF NONMELANOMA SKIN CANCER: ICD-10-CM

## 2022-10-11 DIAGNOSIS — D22.9 MULTIPLE MELANOCYTIC NEVI: ICD-10-CM

## 2022-10-11 DIAGNOSIS — D49.2 NEOPLASM OF UNSPECIFIED BEHAVIOR OF BONE, SOFT TISSUE, AND SKIN: ICD-10-CM

## 2022-10-11 DIAGNOSIS — L82.0 SEBORRHEIC KERATOSES, INFLAMED: ICD-10-CM

## 2022-10-11 DIAGNOSIS — L57.0 AK (ACTINIC KERATOSIS): Primary | ICD-10-CM

## 2022-10-11 PROCEDURE — 17110 DESTRUCTION B9 LES UP TO 14: CPT | Performed by: DERMATOLOGY

## 2022-10-11 PROCEDURE — 88305 TISSUE EXAM BY PATHOLOGIST: CPT | Mod: 26 | Performed by: PATHOLOGY

## 2022-10-11 PROCEDURE — 17003 DESTRUCT PREMALG LES 2-14: CPT | Mod: XS | Performed by: DERMATOLOGY

## 2022-10-11 PROCEDURE — 11103 TANGNTL BX SKIN EA SEP/ADDL: CPT | Mod: XS | Performed by: DERMATOLOGY

## 2022-10-11 PROCEDURE — 17000 DESTRUCT PREMALG LESION: CPT | Mod: XS | Performed by: DERMATOLOGY

## 2022-10-11 PROCEDURE — 99213 OFFICE O/P EST LOW 20 MIN: CPT | Mod: 25 | Performed by: DERMATOLOGY

## 2022-10-11 PROCEDURE — 11102 TANGNTL BX SKIN SINGLE LES: CPT | Mod: XS | Performed by: DERMATOLOGY

## 2022-10-11 PROCEDURE — 88305 TISSUE EXAM BY PATHOLOGIST: CPT | Mod: TC | Performed by: DERMATOLOGY

## 2022-10-11 RX ORDER — ATORVASTATIN CALCIUM 40 MG/1
TABLET, FILM COATED ORAL
COMMUNITY
Start: 2022-06-01

## 2022-10-11 ASSESSMENT — PAIN SCALES - GENERAL: PAINLEVEL: NO PAIN (0)

## 2022-10-11 NOTE — NURSING NOTE
Dermatology Rooming Note    Eliceo Griggs's goals for this visit include:   Chief Complaint   Patient presents with     Skin Check     Eliceo is here today for a skin check      ANN-MARIE Nelson

## 2022-10-11 NOTE — LETTER
10/11/2022       RE: Eliceo Griggs  [address] [place] [address]     Dear Colleague,    Thank you for referring your patient, Eliceo Griggs, to the [facility] DERMATOLOGY CLINIC [place] at [facility]. Please see a copy of my visit note below.    CHIEF COMPLAINT:  Followup skin check.    SUBJECTIVE:  Mr. Eliceo Griggs is a very pleasant 72-year-old male with a history of multiple nonmelanoma skin cancers, both basal and squamous cell carcinoma.  Most recently, in 12/2020, he had 3 biopsies, all of which demonstrated basal cell carcinomas, on the left clavicle, right lateral neck and left abdomen.  These were subsequently treated with conventional excision and Mohs surgery by Dr. Wilber Perkins on 06/08/2021.  Today, he notes 2 irritated spots, one on his right mid chest and one on his left abdomen, which he says catch on clothing and becomes sore when he is playing with his grandchildren.  In addition, he has several rough, scaly spots on his vertex scalp, which come and go.  His son-in-law noticed a spot on his right temple that he thought should be checked by a dermatologist.  Otherwise, he has no problems at his prior surgical scar sites, and no other areas of nonhealing sores or new or changing moles.    REVIEW OF SYSTEMS:  No recent fevers.    PHYSICAL EXAMINATION:    GENERAL:  This is a well-appearing, well-nourished male with a normal mood and affect, who is oriented x3.  SKIN:  A cutaneous exam including the head, neck, chest, abdomen, back, bilateral upper and lower extremities and buttocks was performed.  On the neck, clavicle and abdomen, there are linear, well-healed scars without nodularity or atypical erythema.  On the vertex scalp, there are 2 rough, scaly, pink 3 mm flat-topped papules.  On the right temple, there is a 3 mm pink, flat-topped, somewhat indurated papule.  On the right superior shoulder, there is a pink, domed, firm,  3 mm papule with surface telangiectasias and light-tan pigmentation.  On the right chest, there is a 6 mm, flat-topped, pink papule with somewhat raised borders.  On the right chest and left abdomen, there are crusted, waxy brown papules with pink haloes consistent with inflamed seborrheic keratoses.  The rest of his exam demonstrates benign findings including multiple benign appearing nevi and seborrheic keratoses on the back.    ASSESSMENT AND PLAN:    1.  Neoplasms of uncertain behavior x3.  The area on his right temple is suspicious for basal cell carcinoma, versus hypertrophic actinic keratosis.  The area on his superior shoulder either represents a dermal nevus or a small basal cell carcinoma, and the affected area on the right chest was suspicious for BCC.  Shave biopsies were performed for all 3 lesions.  Please see the procedure note below.  2.  Actinic keratoses x2 on the vertex scalp.  After informed verbal consent, these were each treated with liquid nitrogen x5 seconds x2 cycles.  The patient tolerated this without complication.  3.  Inflamed seborrheic keratoses x2 on the right chest and left abdomen.  After informed verbal consent, these were each treated with liquid nitrogen x10 seconds x2 cycles.  The patient tolerated this without complication.  4.  History of multiple nonmelanoma skin cancers.  Clinically, there is no evidence of recurrence at any of his prior treatment sites.  Reassurance was provided.  5.  Benign findings including seborrheic keratoses and benign-appearing nevi.  Reassurance was provided.    PROCEDURE NOTE:  After signed informed consent, the affected areas were swabbed with an alcohol pad and injected with 1% lidocaine with epinephrine.  Biopsies via shave technique were taken x3 and sent for histopathology.  Hemostasis was achieved with aluminum chloride 20% and the defects were covered with petrolatum and a bandage.  The patient tolerated these procedures without  complication.    He will follow up in our General Dermatology Clinic for a full body skin check in 6 months' time, sooner in Dermatology surgery pending his biopsy results.      René Samaniego MD  Dermatology Attending

## 2022-10-11 NOTE — PROGRESS NOTES
CHIEF COMPLAINT:  Followup skin check.    SUBJECTIVE:  Mr. Eliceo Griggs is a very pleasant 72-year-old male with a history of multiple nonmelanoma skin cancers, both basal and squamous cell carcinoma.  Most recently, in 12/2020, he had 3 biopsies, all of which demonstrated basal cell carcinomas, on the left clavicle, right lateral neck and left abdomen.  These were subsequently treated with conventional excision and Mohs surgery by Dr. Wilber Perkins on 06/08/2021.  Today, he notes 2 irritated spots, one on his right mid chest and one on his left abdomen, which he says catch on clothing and becomes sore when he is playing with his grandchildren.  In addition, he has several rough, scaly spots on his vertex scalp, which come and go.  His son-in-law noticed a spot on his right temple that he thought should be checked by a dermatologist.  Otherwise, he has no problems at his prior surgical scar sites, and no other areas of nonhealing sores or new or changing moles.    REVIEW OF SYSTEMS:  No recent fevers.    PHYSICAL EXAMINATION:    GENERAL:  This is a well-appearing, well-nourished male with a normal mood and affect, who is oriented x3.  SKIN:  A cutaneous exam including the head, neck, chest, abdomen, back, bilateral upper and lower extremities and buttocks was performed.  On the neck, clavicle and abdomen, there are linear, well-healed scars without nodularity or atypical erythema.  On the vertex scalp, there are 2 rough, scaly, pink 3 mm flat-topped papules.  On the right temple, there is a 3 mm pink, flat-topped, somewhat indurated papule.  On the right superior shoulder, there is a pink, domed, firm, 3 mm papule with surface telangiectasias and light-tan pigmentation.  On the right chest, there is a 6 mm, flat-topped, pink papule with somewhat raised borders.  On the right chest and left abdomen, there are crusted, waxy brown papules with pink haloes consistent with inflamed seborrheic keratoses.  The rest of his  exam demonstrates benign findings including multiple benign appearing nevi and seborrheic keratoses on the back.    ASSESSMENT AND PLAN:    1.  Neoplasms of uncertain behavior x3.  The area on his right temple is suspicious for basal cell carcinoma, versus hypertrophic actinic keratosis.  The area on his superior shoulder either represents a dermal nevus or a small basal cell carcinoma, and the affected area on the right chest was suspicious for BCC.  Shave biopsies were performed for all 3 lesions.  Please see the procedure note below.  2.  Actinic keratoses x2 on the vertex scalp.  After informed verbal consent, these were each treated with liquid nitrogen x5 seconds x2 cycles.  The patient tolerated this without complication.  3.  Inflamed seborrheic keratoses x2 on the right chest and left abdomen.  After informed verbal consent, these were each treated with liquid nitrogen x10 seconds x2 cycles.  The patient tolerated this without complication.  4.  History of multiple nonmelanoma skin cancers.  Clinically, there is no evidence of recurrence at any of his prior treatment sites.  Reassurance was provided.  5.  Benign findings including seborrheic keratoses and benign-appearing nevi.  Reassurance was provided.    PROCEDURE NOTE:  After signed informed consent, the affected areas were swabbed with an alcohol pad and injected with 1% lidocaine with epinephrine.  Biopsies via shave technique were taken x3 and sent for histopathology.  Hemostasis was achieved with aluminum chloride 20% and the defects were covered with petrolatum and a bandage.  The patient tolerated these procedures without complication.    He will follow up in our General Dermatology Clinic for a full body skin check in 6 months' time, sooner in Dermatology surgery pending his biopsy results.      René Samaniego MD  Dermatology Attending

## 2022-10-12 LAB
PATH REPORT.COMMENTS IMP SPEC: NORMAL
PATH REPORT.COMMENTS IMP SPEC: NORMAL
PATH REPORT.FINAL DX SPEC: NORMAL
PATH REPORT.GROSS SPEC: NORMAL
PATH REPORT.MICROSCOPIC SPEC OTHER STN: NORMAL
PATH REPORT.RELEVANT HX SPEC: NORMAL

## 2022-10-14 ENCOUNTER — TELEPHONE (OUTPATIENT)
Dept: DERMATOLOGY | Facility: CLINIC | Age: 72
End: 2022-10-14

## 2022-10-14 DIAGNOSIS — C44.91 BCC (BASAL CELL CARCINOMA): Primary | ICD-10-CM

## 2022-10-14 NOTE — TELEPHONE ENCOUNTER
Hello,     Can you please give patient a Mohs referral and help him get set up for that appointment?     Thanks,   [place]

## 2022-10-18 ENCOUNTER — TELEPHONE (OUTPATIENT)
Dept: DERMATOLOGY | Facility: CLINIC | Age: 72
End: 2022-10-18

## 2022-10-18 NOTE — TELEPHONE ENCOUNTER
Called patient to schedule surgery with Dr. Marie    Date of Surgery: 11/30    Surgery type: mohs    Consult scheduled: Yes    Has patient had mohs before? Yes    Additional comments: patient states he has had mohs before - it doesn't look like he has had mohs here before. He has had an excision with Dr. Perkins in 2021      Sunshine Bills on 10/18/2022 at 11:41 AM

## 2022-10-19 NOTE — TELEPHONE ENCOUNTER
FUTURE VISIT INFORMATION      FUTURE VISIT INFORMATION:    Date: 11.21.22    Time: 2:15    Location: Telephone  REFERRAL INFORMATION:    Referring provider:  Aden    Referring providers clinic:  Derm    Reason for visit/diagnosis  BCC Right [place], Right Chest    RECORDS REQUESTED FROM:       Clinic name Comments Records Status Imaging Status   Derm 10.11.22  Path # [id number] Epic Epic

## 2022-11-05 PROBLEM — L82.0 SEBORRHEIC KERATOSES, INFLAMED: Status: ACTIVE | Noted: 2022-11-05

## 2022-11-05 PROBLEM — Z85.828 HISTORY OF NONMELANOMA SKIN CANCER: Status: ACTIVE | Noted: 2022-11-05

## 2022-11-05 PROBLEM — D22.9 MULTIPLE MELANOCYTIC NEVI: Status: ACTIVE | Noted: 2022-11-05

## 2022-11-05 PROBLEM — L57.0 AK (ACTINIC KERATOSIS): Status: ACTIVE | Noted: 2022-11-05

## 2022-11-05 PROBLEM — D49.2 NEOPLASM OF UNSPECIFIED BEHAVIOR OF BONE, SOFT TISSUE, AND SKIN: Status: ACTIVE | Noted: 2022-11-05

## 2022-11-21 ENCOUNTER — PRE VISIT (OUTPATIENT)
Dept: DERMATOLOGY | Facility: CLINIC | Age: 72
End: 2022-11-21

## 2022-11-21 ENCOUNTER — VIRTUAL VISIT (OUTPATIENT)
Dept: DERMATOLOGY | Facility: CLINIC | Age: 72
End: 2022-11-21
Payer: COMMERCIAL

## 2022-11-21 DIAGNOSIS — C44.519 BASAL CELL CARCINOMA (BCC) OF CHEST: ICD-10-CM

## 2022-11-21 DIAGNOSIS — C44.319 BASAL CELL CARCINOMA (BCC) OF RIGHT TEMPLE REGION: Primary | ICD-10-CM

## 2022-11-21 PROCEDURE — 99213 OFFICE O/P EST LOW 20 MIN: CPT | Mod: GC | Performed by: DERMATOLOGY

## 2022-11-21 NOTE — PATIENT INSTRUCTIONS
[facility] Dermatology Visit    Thank you for allowing us to participate in your care. Your findings, instructions and follow-up plan are as follows:         When should I call my doctor?  If you are worsening or not improving, please, contact us or seek urgent care as noted below.     Who should I call with questions (adults)?  [facility] (adult and pediatric): [phone]  [facility] (adult): [phone]  For urgent needs outside of business hours call the [facility] at [phone] and ask for the dermatology resident on call  If this is a medical emergency and you are unable to reach an ER, Call 911    Who should I call with questions (pediatric)?  [facility]- Pediatric Dermatology  Dr. Mirian Dyer, Dr. Jerman Veras, Dr. Aydee Shultz, Jossy Rock, PA  Dr. Elvia Kim, Dr. Griselda Ewing & Dr. Jonh Pantoja  Non Urgent  Nurse Triage Line; [phone]- Diane and Yuki RN Care Coordinators   Pratibha (/Complex ) [phone]    If you need a prescription refill, please contact your pharmacy. Refills are approved or denied by our physicians during normal business hours, Monday through Fridays  Per office policy, refills will not be granted if you have not been seen within the past year (or sooner depending on your child's condition).    Scheduling Information:  Pediatric Appointment Scheduling and Call Center [phone]  Radiology Scheduling- [phone]  Sedation Unit Scheduling- [phone]  [place] Scheduling- General [phone]; Pediatric Dermatology [phone]  Main  Services: [phone]  Irish: [phone]  South African: [phone]  Hmong/Dawit/Nauruan: [phone]  Preadmission Nursing Department Fax Number: [phone] (fax all pre-operative paperwork to this number)    For urgent matters arising during evenings, weekends, or  holidays that cannot wait for normal business hours please call [phone] and ask for the dermatology resident on call to be paged.

## 2022-11-21 NOTE — PROGRESS NOTES
Mohs Micrographic Surgery Consult Note    Nov 21, 2022  Start time (if telephone encounter): 2:15 p.m.  End time (if telephone encounter): 2:25 p.m.    Dermatology Problem List:  1. NMSC  - BCC, right temple, s/p shave bx 10/11/2022  - BCC, right chest, s/p shave bx 10/11/2022  - BCC Left central upper abdomen s/p excision 6/8/21  - BCC Left clavicle s/p excision 6/8/21  - BCC  Right lateral neck s/p MMS 6/8/21  - BCC, lower anterior neck, s/p excision 3/12/20  - BCC 01/2019 L clavicle, s/p imiquimod  - BCC 02/2016 x3 from right chest, right neck, and mid back s/p ED&C  - BCC 06/2014, right cheek s/p Mohs  2. Hypertrophic scar at prior ED&C site of superficial BCC from mid back s/p biopsy 4/14/16  3. Seborrheic dermatitis  - Hydrocortisone 2.5% ointment prn   4. AKs  5. Dysplastic nevus  - Right lower back, moderate dysplasia, biopsy 6/10/14, excision 7/24/14  6. Intradermal nevus, right shoulder, s/p shave bx 10/11/2022    Subjective: The patient is a 72 year old man who presents today for Mohs micrographic surgery consultation for a recent diagnosis of skin cancer.    Skin cancer(s): BCC  Location(s): right temple and right chest  Associated symptoms: pruritus, tenderness to touch  Onset: within last 1 year    No other associated symptoms, modifying factors, or prior treatments, except when noted above. The patient denies any constitutional symptoms, lymphadenopathy, unintentional weight loss or decreased appetite. No other skin concerns today.    Objective:   Skin: Focused examination of the chest and right temple within the teledermatology photograph(s) on 10/11/22 was performed.   - 0.6 cm pink papule on right chest  - 0.6 cm pink papule on right temple    Assessment and Plan:     1. BCC, right temple, s/p shave bx 10/11/2022  - Plan for Mohs micrographic surgery for skin cancer(s) above:  - We discussed the nature of the diagnosis/condition above. We discussed the treatment options, including the risks  benefits and expectations of these options. We recommend micrographic surgery as the most effective and most tissue sparing option for treatment, and the patient agrees to proceed with this.  The patient is aware of the risks, benefits and expectations of this procedure. The patient will be scheduled for this procedure, if not already done so.  - We anticipate the following closure type: Linear closure, such as complex linear closure (CLC)    2. BCC, right chest, s/p shave bx 10/11/2022  - Plan for wide local excision. Procedure reviewed in detail with patient      Scribe Disclosure:  I, David Parekh, am serving as a scribe to document services personally performed by Skyler Marie MD based on data collection and the provider's statements to me.     Attending Attestation  I attest that the Scribe recorded the interview and exam that I personally performed.  I have reviewed the note and edited it as necessary.    Skyler Marie M.D.  Professor  Director of Dermatologic Surgery  Department of Dermatology  [facility]

## 2022-11-21 NOTE — NURSING NOTE
Chief Complaint   Patient presents with     Derm Problem     Consult for katrinas.      Samantha DEMARCO CMA

## 2022-11-21 NOTE — LETTER
11/21/2022       RE: Eliceo Griggs  [address] [place] [address]     Dear Colleague,    Thank you for referring your patient, Eliceo Griggs, to the [facility] DERMATOLOGIC SURGERY CLINIC [place] at [facility]. Please see a copy of my visit note below.    Mohs Micrographic Surgery Consult Note    Nov 21, 2022  Start time (if telephone encounter): 2:15 p.m.  End time (if telephone encounter): 2:25 p.m.    Dermatology Problem List:  1. NMSC  - BCC, right temple, s/p shave bx 10/11/2022  - BCC, right chest, s/p shave bx 10/11/2022  - BCC Left central upper abdomen s/p excision 6/8/21  - BCC Left clavicle s/p excision 6/8/21  - BCC  Right lateral neck s/p MMS 6/8/21  - BCC, lower anterior neck, s/p excision 3/12/20  - BCC 01/2019 L clavicle, s/p imiquimod  - BCC 02/2016 x3 from right chest, right neck, and mid back s/p ED&C  - BCC 06/2014, right cheek s/p Mohs  2. Hypertrophic scar at prior ED&C site of superficial BCC from mid back s/p biopsy 4/14/16  3. Seborrheic dermatitis  - Hydrocortisone 2.5% ointment prn   4. AKs  5. Dysplastic nevus  - Right lower back, moderate dysplasia, biopsy 6/10/14, excision 7/24/14  6. Intradermal nevus, right shoulder, s/p shave bx 10/11/2022    Subjective: The patient is a 72 year old man who presents today for Mohs micrographic surgery consultation for a recent diagnosis of skin cancer.    Skin cancer(s): BCC  Location(s): right temple and right chest  Associated symptoms: pruritus, tenderness to touch  Onset: within last 1 year    No other associated symptoms, modifying factors, or prior treatments, except when noted above. The patient denies any constitutional symptoms, lymphadenopathy, unintentional weight loss or decreased appetite. No other skin concerns today.    Objective:   Skin: Focused examination of the chest and right temple within the teledermatology photograph(s) on 10/11/22 was performed.   - 0.6  cm pink papule on right chest  - 0.6 cm pink papule on right temple    Assessment and Plan:     1. BCC, right temple, s/p shave bx 10/11/2022  - Plan for Mohs micrographic surgery for skin cancer(s) above:  - We discussed the nature of the diagnosis/condition above. We discussed the treatment options, including the risks benefits and expectations of these options. We recommend micrographic surgery as the most effective and most tissue sparing option for treatment, and the patient agrees to proceed with this.  The patient is aware of the risks, benefits and expectations of this procedure. The patient will be scheduled for this procedure, if not already done so.  - We anticipate the following closure type: Linear closure, such as complex linear closure (CLC)    2. BCC, right chest, s/p shave bx 10/11/2022  - Plan for wide local excision. Procedure reviewed in detail with patient      Scribe Disclosure:  I, David Parekh, am serving as a scribe to document services personally performed by Skyler Marie MD based on data collection and the provider's statements to me.     Attending Attestation  I attest that the Scribe recorded the interview and exam that I personally performed.  I have reviewed the note and edited it as necessary.    Skyler Marie M.D.  Professor  Director of Dermatologic Surgery  Department of Dermatology  [facility]

## 2022-11-30 ENCOUNTER — OFFICE VISIT (OUTPATIENT)
Dept: DERMATOLOGY | Facility: CLINIC | Age: 72
End: 2022-11-30
Payer: COMMERCIAL

## 2022-11-30 VITALS — DIASTOLIC BLOOD PRESSURE: 91 MMHG | HEART RATE: 69 BPM | SYSTOLIC BLOOD PRESSURE: 172 MMHG

## 2022-11-30 DIAGNOSIS — C44.319 BASAL CELL CARCINOMA (BCC) OF RIGHT TEMPLE REGION: Primary | ICD-10-CM

## 2022-11-30 DIAGNOSIS — C44.519 BASAL CELL CARCINOMA (BCC) OF CHEST: ICD-10-CM

## 2022-11-30 PROCEDURE — 11602 EXC TR-EXT MAL+MARG 1.1-2 CM: CPT | Performed by: DERMATOLOGY

## 2022-11-30 PROCEDURE — 13132 CMPLX RPR F/C/C/M/N/AX/G/H/F: CPT | Performed by: DERMATOLOGY

## 2022-11-30 PROCEDURE — 17312 MOHS ADDL STAGE: CPT | Performed by: DERMATOLOGY

## 2022-11-30 PROCEDURE — 17311 MOHS 1 STAGE H/N/HF/G: CPT | Mod: 59 | Performed by: DERMATOLOGY

## 2022-11-30 PROCEDURE — 12032 INTMD RPR S/A/T/EXT 2.6-7.5: CPT | Performed by: DERMATOLOGY

## 2022-11-30 PROCEDURE — 88305 TISSUE EXAM BY PATHOLOGIST: CPT | Mod: 26 | Performed by: DERMATOLOGY

## 2022-11-30 PROCEDURE — 88305 TISSUE EXAM BY PATHOLOGIST: CPT | Mod: TC | Performed by: DERMATOLOGY

## 2022-11-30 RX ORDER — LISINOPRIL 40 MG/1
40 TABLET ORAL DAILY
COMMUNITY

## 2022-11-30 ASSESSMENT — PAIN SCALES - GENERAL: PAINLEVEL: NO PAIN (0)

## 2022-11-30 NOTE — PATIENT INSTRUCTIONS
Wound care    I will experience scar (unavoidable when the skin is cut with surgery), and may experience bleeding, swelling, pain, crusting, and redness. I may experience incomplete removal (very uncommon) or recurrence (very uncommon). Risks of the procedure are bleeding, bruising, swelling, infection, nerve damage, and a large wound. These occur very uncommonly but are still theoretically possible.    Caring for your skin after surgery    After your surgery, a pressure bandage will be placed over the area that has stitches. This is important to prevent bleeding. Please follow these instructions over the next 1 to 2 weeks. Following this regimen will help to prevent complications as your wound heals and produce the best possible scar over time.    For the first 48 hours after your surgery:    Leave the pressure dressing on and keep it dry. If it should come loose, you may re-tape it, but do not take it off.  Relax and take it easy. Do not do any vigorous exercise or heavy lifting. This could cause the wound to bleed, sutures to break, and the wound to enlarge.  Post-operative pain is usually mild to moderate. Please see the detailed pain medication instructions at the bottom of this message regarding the use of Tylenol (acetaminophen) and ibuprofen.  Avoid alcohol as this may increase your tendency to bleed.  You may put an ice pack around the bandaged area for 20 minutes at a time as needed multiple times daily. This may help reduce swelling, bruising, and pain. Make sure the ice pack is waterproof so that the pressure bandage doesn t get wet.  If your surgical wound is on the face, try your best to sleep with your head elevated. Either in a recliner or propped up in bed with pillows as this will decrease swelling around the eyes, especially when surgery was on the upper face or mid-face.  You may see a small amount of drainage or blood on your pressure bandage. This is normal. However:  If drainage or bleeding  continues or saturates the bandage, you will need to apply firm pressure over the bandage with a piece of gauze, a clean towel, or a waterproof ice pack (an ice pack is best) for 20 minutes continuously without letting up the pressure early. If you let up the pressure earlier than 20 minutes, then the timer restarts.   If after applying continuous pressure for 20 minutes you check and still see active bleeding, then continue for an additional 20 minute period. Waterproof ice packs are best for applying pressure.  If bleeding still continues after two 20-minute periods, call our office or go to the nearest emergency room.    Remove pressure dressing 48 hours after surgery:    Carefully remove the pressure bandage. If it seems sticky or too difficult to get off, you may need to soak it with room temperature water, such as with wet compresses or in the shower.  After the pressure dressing is removed, you may shower and get the wound wet. However, do not let the forceful stream of the direct water pressure from the shower hit the wound directly. This will interfere with wound healing by knocking of skin cells that are migrating into the wound to heal the wound.  Follow these wound care and dressing change instructions:  In the shower, wash the surgical site LAST with its own separate clean wash cloth. Wash everything but the surgical site first, THEN wash the surgical site to avoid contamination from other body areas.  You may allow water to run over the site - just not shooting water on top of the surgical site. Take a clean wash cloth wet with soapy warm water and gently pat the suture site to help remove any crust or drainage that may develop.  Do not rub or scrub the site  After the site is clean, pat dry and apply a thin layer of Vaseline ointment over the suture site with a cotton swab or clean finger.   Cover the suture site with Telfa (non-stick) dressing. You may tape a piece of gauze over the Telfa for extra  "protection if you wish. If you do not have Telfa, then use a clean bandage that is large enough to cover the surgical site without sticky adhesive directly touching the wound or sutures, which would be painful to rip off.  Continue wound care at least once a day, or twice a day if you are active or around a dirty or contaminated environment for work.  Continue daily wound care until your surgical site is completely healed. To determine this, around 2 weeks post procedure, you can take a cotton swab with a small amount of hydrogen peroxide and roll it on the suture site. If the site bubbles and turns white, then your wound is still healing and has not completely sealed shut. Please continue wound care until the area no longer bubbles up from the hydrogen peroxide.  Regarding dissolving stitches: if you have been told your stitches are dissolving (also called \"absorbable sutures\"), they should dissolve in 1-2 weeks. If the absorbable/dissolving stitches do not dissolve by 1-2 weeks, you can use a Q-tip with hydrogen peroxide on it and roll it along the suture line to help the stitches dissolve and come out. Please give us a call if stitches are still present after two weeks. If you do not keep your wound moist at all times, such as with Vaseline or petroleum jelly, while it heals, the dissolving sutures will dry out and not dissolve. They need a moist environment in order to dissolve normally, which is why a coat of Vaseline is important every day.      Follow up is typically a 3-month scar evaluation appointment either in-person in the clinic, or via a telephone visit with you sending in a photo via [employer]Time. This is unless you have been told to follow up sooner, or if you have concerns and would like to be see sooner for a post-operative issue. Please call or send us a BioTime message with a photo if possible. A BioTime message with a photo is usually the fastest way for us to be able to make an assessment of a " "post-operative issue.        What to expect:    The first couple of days your wound may be tender and may bleed slightly when doing wound care.  There may be swelling and bruising around the wound, especially if it is near the eyes. For surgeries of the upper face or mid-face, \"black eyes\" (bruising within the eye sockets) and swelling around the eyes is very common. For your comfort, you may apply ice or cold compresses to the bruises after your have removed the pressure bandage.  The area around your wound may be numb for several weeks or even months. Nerve endings are typically the slowest thing to regenerate and can take even up to 1 year to completely regenerate. Very, very rarely, patients can report permanent changes in their sensation after surgery.  You may experience periodic sharp pain or mild itching around the wound as it heals.  The suture line will look dark and raised for a while but will lighten and flatten over time. This can take up to 3-6 months.    For post-operative pain control:    If you are able to take acetaminophen (\"Tylenol,\" etc.) and ibuprofen (\"Advil\" or \"Motrin,\" etc.), then you may STAGGER these medications by taking 400 mg of ibuprofen (usually two tabs) every 8 hours and 1,000 mg of acetaminophen (e.g., two tabs of extra-strength Tylenol) every 8 hours.    This means, for example, that you could take the followin,000 mg of Tylenol, followed 4 hours later by 400 mg Ibuprofen, followed 4 hours later with 1,000 mg of Tylenol, followed 4 hours later by 400 mg Ibuprofen, followed 4 hours later with 1,000 mg of Tylenol, and so forth.     Essentially, you can take either 1,000 mg of Tylenol or 400 mg of ibuprofen in alternating fashion EVERY FOUR HOURS.    Do NOT exceed more than 4,000 mg of Tylenol or 3,200 mg of ibuprofen per 24 hours. If you are not able to take Tylenol or ibuprofen as above due to other health issues (or a physician has told you directly that you are not allowed " to take one of them, say due to pre-existing severe liver or kidney issues), then disregard the above directions.    Scientific evidence supports that this combination/schedule of pain medications is just as effective, if not more effective, than taking a narcotic pain medicine.      When to contact us:    You have bleeding that will not stop after applying pressure, including with waterproof ice packs, as above.  You have pain that is not controlled with Tylenol (acetaminophen) and/or ibuprofen.  You have signs or symptoms of an infection, such as:  Fever over 100 degrees Fahrenheit  Redness expanding half an inch past the surgery site, or foul-smelling drainage from the wound  If you have any follow-up questions, or are not sure how to take care of the wound.    Note that TenTwenty7 is often the fastest way to contact us, and we recommend registering for KaChing! if you are able to and have technology access. You can register for KaChing! by going to [employer] or calling [phone] and asking for help with registering.    Phone numbers:    During business hours (M-F 8:00-4:30 p.m.)  [facility]-  [phone] Option 1 appt. desk  [phone]  Option 3 nurse triage line  ---------------------------------------------------------  Evenings/Weekends/Holidays  Hospital - [phone]   TTY for hearing mppxmsok-[phone]  *Ask  to page dermatologist on-call  Emergency Mwjn-[phone]  TTY for hearing impaired- [phone]

## 2022-11-30 NOTE — PROGRESS NOTES
[facility] Mohs Surgery Procedure Note    Case #: 1  Date of Service:  Nov 30, 2022  Surgery: Mohs micrographic surgery (MMS)  Staff surgeon: Skyler Marie MD  Fellow surgeon: Huy Saenz MD  Resident surgeon: Kami Jose MD  Nurse: Mirian Herman RN    Tumor Type: BCC  Location: Right temple  Derm-Path Accession #: [id number]    Mohs Accession #:   Pre-Op Size: 1.0 cm x 1.0 cm  Final Defect Size: 1.1 cm x 1.0 cm  Number of Mohs stages: 2  Level of Defect: Fat  Local anesthetic: 6 mL 1% lidocaine with epinephrine  Repair Type: Complex repair  Repair Size: 3.5 cm  Suture Material: 4-0 Monocryl; 5-0 fast absorbing gut    Procedure:    Stage I  We discussed the principles of treatment and most likely complications including scarring, bleeding, infection, swelling, pain, crusting, nerve damage, large wound,  incomplete excision, wound dehiscence,  nerve damage, recurrence, and a second procedure may be recommended to obtain the best cosmetic or functional result.    Informed consent was obtained and the patient underwent the procedure as follows:  The patient was placed supine on the operating table.  The cancer was identified, outlined with a marker, and verified by the patient.  The entire surgical field was prepped with chlorhexidine.  The surgical site was anesthetized using lidocaine with epinephrine.    The area of clinically apparent tumor was debulked. The layer of tissue was then surgically excised using a #15 blade and was then transferred onto a specimen sheet maintaining the orientation of the specimen. Hemostasis was obtained using electrocoagulation. The wound site was then covered with a dressing while the tissue samples were processed for examination.    The excised tissue was transported to the Mohs histology laboratory maintaining the tissue orientation.  The tissue specimen was relaxed so that the entire surgical margin was in a a single horizontal plane for  sectioning and inked for precise mapping.  A precise reference map was drawn to reflect the sectioning of the specimen, colored inking of the margins, and orientation on the patient.  The tissue was processed using horizontal sectioning of the base and continuous peripheral margins.  The histopathologic sections were reviewed in conjunction with the reference map.    Total blocks: 1  Total slides: 2    Residual tumor was identified and indicated in red on the reference map, identifying the location where further tissue excision was necessary. Therefore, an additional stage of Mohs Micrographic surgery was deemed necessary.     Stage II  The patient was returned to the operating room, and the area prepped in the usual manner. The residual tumor was excised using the reference map as a guide. The specimen was transfered to a labeled specimen sheet maintaining the orientation of the specimen. Hemostasis was obtained and the wound site was covered with a dressing while the tissue was processed for examination.     The excised tissue was transported to the Mohs histology laboratory maintaining orientation. The specimen margins were inked for precise mapping and a reference map was prepared for the is additional stage to maintain precise orientation as described above. The tissue was processed using horizontal sectioning of the base and continuous peripheral margins. The histopathologic sections were reviewed in conjunction with the reference map.     Total blocks: 1   Total slides:  2    There were no cancer cells visualized on examination, therefore Mohs surgery was complete.      REPAIR:     Due to one or more of the following factors, complex linear closure was required/indicated: Extensive undermining (equal to or greater than the maximum perpendicular width of the defect), exposure of underlying structure (bone, cartilage, tendon, named neurovascular), free margin involvement (helical rim, vermillion border, nostril  rim), and/or placement of retention sutures.    After the excision of the tumor, the area was extensively and carefully undermined using tissue scissors. Hemostasis was obtained with spot electrocautery and ligation of vessels where necessary. Initial deep plication sutures of 4-0 Monocryl sutures were placed in the deep, subcutaneous, and fascial planes to appose the lateral margins. The subcutaneous and dermal layers were then closed with 4-0 Monocryl sutures. The epidermis was then carefully approximated along the length of the wound using 5-0 fast absorbing gut running sutures.     Estimated blood loss was less than 10 ml for all surgical sites. A sterile pressure dressing was applied and wound care instructions, with a written handout, were given. The patient was discharged from the [facility]atologic Surgery Center alert and ambulatory.    Skyler Marie MD staffed the patient and was present for the key portions of the above procedure.     [facility] Dermatologic Surgery Excision Note    Case #: 2  Date of Service:  Nov 30, 2022  Surgery: Wide local excision  Staff Surgeon: Skyler Marie MD  Fellow Surgeon: Huy Saenz MD  Resident Surgeon: Kami Jose MD  Nurse: Mirian Herman RN    Tumor Type: BCC  Location: Right chest  Derm-Path Accession #: [id number]    Skin Lesion Size:  1.0 cm x 1.0 cm  Margin: 4 mm  Excision size: 1.8 cm x 1.8 cm  Level of Defect: Fat  Repair Type: Intermediate linear  Repair Size: 5.5 cm  Suture Material: 3-0 Vicryl; 4-0 Monocryl    INDICATIONS:  The patient was scheduled for wide local excision of the skin lesion documented above. We discussed the principles of treatment and most likely complications including scarring, bleeding, infection, incomplete excision, wound dehiscence, pain, nerve damage, and recurrence. Informed consent was obtained and the patient underwent the procedure as follows:    PROCEDURE:  With the patient in a supine position, the lesion was  outlined with the margin documented above.  The lesion and the necessary margin (excised diameter) was measured and documented as above.  An ellipse was designed around the lesion to conform to relaxed skin tension lines in an effort to minimize scarring and deformity.  The lesion and surrounding skin were prepped with chlorhexidine, draped, and anesthetized with lidocaine with epinephrine. Using a #15 blade, the skin was excised along premarked lines.  The level of the defect is documented as above.  Wound margins were undermined to limit functional deformity/impairment of adjacent structures. Bleeding vessels were controlled with electrocoagulation.  The dermis and subcutaneous tissue were closed with buried vertical mattress sutures using 3-0 Vicryl.  Epidermal approximation was meticulously refined with 4-0 Monocryl subcuticular sutures, resulting in a linear closure with little to no wound tension.  Blood loss was estimated to be less than 5 mL.  The area was coated with petrolatum and covered with a non-adherent dressing followed by gauze and tape. Postoperative instructions were reviewed per protocol.  The patient left alert and fully oriented.    Follow-up for suture removal: Not applicable as only dissolving sutures used    Skyler Marie MD was immediately available for the entire surgery and was physicially present for the key portions of the procedure.    Huy Saenz MD  Micrographic Surgery and Dermatologic Oncology (MSDO) Fellow    Scribe Disclosure:  I, Ihsan Alarcon, am serving as a scribe to document services personally performed by Skyler Marie MD based on data collection and the provider's statements to me.       Attending attestation:  I was present for key elements of the procedure and immediately available for all other portions of the procedure.  I have reviewed the note and edited it as necessary.    Skyler Marie M.D.  Professor  Director of Dermatologic Surgery  Department of  Dermatology  [facility]    Dermatology Surgery Clinic  [facility]  [address] [place] [id number]

## 2022-11-30 NOTE — LETTER
11/30/2022       RE: Eliceo Griggs  [address] [place] [address]     Dear Colleague,    Thank you for referring your patient, Eliceo Griggs, to the [facility] DERMATOLOGIC SUR[facility]IC [place] at [facility]. Please see a copy of my visit note below.    [facility] Mohs Surgery Procedure Note    Case #: 1  Date of Service:  Nov 30, 2022  Surgery: Mohs micrographic surgery (MMS)  Staff surgeon: Skyler Marie MD  Fellow surgeon: Huy Saenz MD  Resident surgeon: Kami Jose MD  Nurse: Mirian Herman RN    Tumor Type: BCC  Location: Right temple  Derm-Path Accession #: [id number]    Mohs Accession #:   Pre-Op Size: 1.0 cm x 1.0 cm  Final Defect Size: 1.1 cm x 1.0 cm  Number of Mohs stages: 2  Level of Defect: Fat  Local anesthetic: 6 mL 1% lidocaine with epinephrine  Repair Type: Complex repair  Repair Size: 3.5 cm  Suture Material: 4-0 Monocryl; 5-0 fast absorbing gut    Procedure:    Stage I  We discussed the principles of treatment and most likely complications including scarring, bleeding, infection, swelling, pain, crusting, nerve damage, large wound,  incomplete excision, wound dehiscence,  nerve damage, recurrence, and a second procedure may be recommended to obtain the best cosmetic or functional result.    Informed consent was obtained and the patient underwent the procedure as follows:  The patient was placed supine on the operating table.  The cancer was identified, outlined with a marker, and verified by the patient.  The entire surgical field was prepped with chlorhexidine.  The surgical site was anesthetized using lidocaine with epinephrine.    The area of clinically apparent tumor was debulked. The layer of tissue was then surgically excised using a #15 blade and was then transferred onto a specimen sheet maintaining the orientation of the specimen. Hemostasis was obtained using  electrocoagulation. The wound site was then covered with a dressing while the tissue samples were processed for examination.    The excised tissue was transported to the Mohs histology laboratory maintaining the tissue orientation.  The tissue specimen was relaxed so that the entire surgical margin was in a a single horizontal plane for sectioning and inked for precise mapping.  A precise reference map was drawn to reflect the sectioning of the specimen, colored inking of the margins, and orientation on the patient.  The tissue was processed using horizontal sectioning of the base and continuous peripheral margins.  The histopathologic sections were reviewed in conjunction with the reference map.    Total blocks: 1  Total slides: 2    Residual tumor was identified and indicated in red on the reference map, identifying the location where further tissue excision was necessary. Therefore, an additional stage of Mohs Micrographic surgery was deemed necessary.     Stage II  The patient was returned to the operating room, and the area prepped in the usual manner. The residual tumor was excised using the reference map as a guide. The specimen was transfered to a labeled specimen sheet maintaining the orientation of the specimen. Hemostasis was obtained and the wound site was covered with a dressing while the tissue was processed for examination.     The excised tissue was transported to the Mohs histology laboratory maintaining orientation. The specimen margins were inked for precise mapping and a reference map was prepared for the is additional stage to maintain precise orientation as described above. The tissue was processed using horizontal sectioning of the base and continuous peripheral margins. The histopathologic sections were reviewed in conjunction with the reference map.     Total blocks: 1   Total slides:  2    There were no cancer cells visualized on examination, therefore Mohs surgery was complete.       REPAIR:     Due to one or more of the following factors, complex linear closure was required/indicated: Extensive undermining (equal to or greater than the maximum perpendicular width of the defect), exposure of underlying structure (bone, cartilage, tendon, named neurovascular), free margin involvement (helical rim, vermillion border, nostril rim), and/or placement of retention sutures.    After the excision of the tumor, the area was extensively and carefully undermined using tissue scissors. Hemostasis was obtained with spot electrocautery and ligation of vessels where necessary. Initial deep plication sutures of 4-0 Monocryl sutures were placed in the deep, subcutaneous, and fascial planes to appose the lateral margins. The subcutaneous and dermal layers were then closed with 4-0 Monocryl sutures. The epidermis was then carefully approximated along the length of the wound using 5-0 fast absorbing gut running sutures.     Estimated blood loss was less than 10 ml for all surgical sites. A sterile pressure dressing was applied and wound care instructions, with a written handout, were given. The patient was discharged from the Dermatologic Surgery Center alert and ambulatory.    Skyler Marie MD staffed the patient and was present for the key portions of the above procedure.     [facility] Dermatologic Surgery Excision Note    Case #: 2  Date of Service:  Nov 30, 2022  Surgery: Wide local excision  Staff Surgeon: Skyler Marie MD  Fellow Surgeon: Huy Saenz MD  Resident Surgeon: Kami Jose MD  Nurse: Mirian Herman RN    Tumor Type: BCC  Location: Right chest  Derm-Path Accession #: [id number]    Skin Lesion Size:  1.0 cm x 1.0 cm  Margin: 4 mm  Excision size: 1.8 cm x 1.8 cm  Level of Defect: Fat  Repair Type: Intermediate linear  Repair Size: 5.5 cm  Suture Material: 3-0 Vicryl; 4-0 Monocryl    INDICATIONS:  The patient was scheduled for wide local excision of the skin lesion  documented above. We discussed the principles of treatment and most likely complications including scarring, bleeding, infection, incomplete excision, wound dehiscence, pain, nerve damage, and recurrence. Informed consent was obtained and the patient underwent the procedure as follows:    PROCEDURE:  With the patient in a supine position, the lesion was outlined with the margin documented above.  The lesion and the necessary margin (excised diameter) was measured and documented as above.  An ellipse was designed around the lesion to conform to relaxed skin tension lines in an effort to minimize scarring and deformity.  The lesion and surrounding skin were prepped with chlorhexidine, draped, and anesthetized with lidocaine with epinephrine. Using a #15 blade, the skin was excised along premarked lines.  The level of the defect is documented as above.  Wound margins were undermined to limit functional deformity/impairment of adjacent structures. Bleeding vessels were controlled with electrocoagulation.  The dermis and subcutaneous tissue were closed with buried vertical mattress sutures using 3-0 Vicryl.  Epidermal approximation was meticulously refined with 4-0 Monocryl subcuticular sutures, resulting in a linear closure with little to no wound tension.  Blood loss was estimated to be less than 5 mL.  The area was coated with petrolatum and covered with a non-adherent dressing followed by gauze and tape. Postoperative instructions were reviewed per protocol.  The patient left alert and fully oriented.    Follow-up for suture removal: Not applicable as only dissolving sutures used    Skyler Marie MD was immediately available for the entire surgery and was physicially present for the key portions of the procedure.    Huy Saenz MD  Micrographic Surgery and Dermatologic Oncology (MSDO) Fellow    Scribe Disclosure:  Ihsan VALERIO am serving as a scribe to document services personally performed by Skyler Marie MD based  on data collection and the provider's statements to me.       Attending attestation:  I was present for key elements of the procedure and immediately available for all other portions of the procedure.  I have reviewed the note and edited it as necessary.    Skyler Marie M.D.  Professor  Director of Dermatologic Surgery  Department of Dermatology  [facility]    Dermatology Surgery Clinic  [facility] Surgery [place] [phone]

## 2022-11-30 NOTE — NURSING NOTE
Dermatology Rooming Note    Eliceo Griggs's goals for this visit include:   Chief Complaint   Patient presents with     Basal Cell Carcinoma     Eliceo is here for BCC on right temple and right chest.    ,  Steph Hansen, Visit Facilitator

## 2023-06-02 ENCOUNTER — HEALTH MAINTENANCE LETTER (OUTPATIENT)
Age: 73
End: 2023-06-02

## 2023-07-17 ENCOUNTER — TELEPHONE (OUTPATIENT)
Dept: DERMATOLOGY | Facility: CLINIC | Age: 73
End: 2023-07-17
Payer: COMMERCIAL

## 2023-07-17 NOTE — TELEPHONE ENCOUNTER
[facility] Call Center    Phone Message    May a detailed message be left on voicemail: yes     Reason for Call: Symptoms or Concerns     If patient has red-flag symptoms, warm transfer to triage line    Current symptom or concern: Pt discovered 2 new spots on his head about 2 months ago. It is scaly and not healing. Pt has a Hx of skin cancer. Pt was able to schedule the soonest Appt 10/05/23 with Kimberly Boyd NP at the  location and wait listed.     Symptoms have been present for:  2 month(s)    Has patient previously been seen for this? No    By : NA    Date: NA    Are there any new or worsening symptoms? Yes: Please review and call pt back to discuss. Thanks       Action Taken: Message routed to:  [facility] ([facility] Derm    Travel Screening: Not Applicable

## 2023-07-25 ENCOUNTER — OFFICE VISIT (OUTPATIENT)
Dept: DERMATOLOGY | Facility: CLINIC | Age: 73
End: 2023-07-25
Payer: COMMERCIAL

## 2023-07-25 DIAGNOSIS — Z85.828 HISTORY OF NONMELANOMA SKIN CANCER: ICD-10-CM

## 2023-07-25 DIAGNOSIS — L57.0 AK (ACTINIC KERATOSIS): ICD-10-CM

## 2023-07-25 DIAGNOSIS — L81.4 LENTIGINES: ICD-10-CM

## 2023-07-25 DIAGNOSIS — L57.8 ACTINIC SKIN DAMAGE: Primary | ICD-10-CM

## 2023-07-25 PROCEDURE — 99213 OFFICE O/P EST LOW 20 MIN: CPT | Mod: 25 | Performed by: STUDENT IN AN ORGANIZED HEALTH CARE EDUCATION/TRAINING PROGRAM

## 2023-07-25 PROCEDURE — 17003 DESTRUCT PREMALG LES 2-14: CPT | Performed by: STUDENT IN AN ORGANIZED HEALTH CARE EDUCATION/TRAINING PROGRAM

## 2023-07-25 PROCEDURE — 17000 DESTRUCT PREMALG LESION: CPT | Performed by: STUDENT IN AN ORGANIZED HEALTH CARE EDUCATION/TRAINING PROGRAM

## 2023-07-25 NOTE — NURSING NOTE
Dermatology Rooming Note    Eliceo Griggs's goals for this visit include:   Chief Complaint   Patient presents with    Skin Check     2 spots of concern on scalp. Patient states it has been spontaneously bleeding and scabbing.     Brandon Thompson, EMT-B

## 2023-07-25 NOTE — PROGRESS NOTES
[facility] Dermatology Note    Encounter Date: Jul 25, 2023    Dermatology Problem List:  1. NMSC  - BCC, right temple, s/p shave bx 10/11/22, s/p MMS 11/30/22 c/b seroma  - BCC, right chest, s/p shave bx 10/11/22, s/p WLE 11/30/22  - BCC Left central upper abdomen s/p excision 6/8/21  - BCC Left clavicle s/p excision 6/8/21  - BCC  Right lateral neck s/p MMS 6/8/21  - BCC, lower anterior neck, s/p excision 3/12/20  - BCC 01/2019 L clavicle, s/p imiquimod  - BCC 02/2016 x3 from right chest, right neck, and mid back s/p ED&C  - BCC 06/2014, right cheek s/p Mohs  2. Hypertrophic scar at prior ED&C site of superficial BCC from mid back s/p biopsy 4/14/16  3. Seborrheic dermatitis, eyebrows, stable  - Hydrocortisone 2.5% ointment PRN   4. AKs   -s/p cryo 7/25/23  5. Dysplastic nevus  - Right lower back, moderate dysplasia, biopsy 6/10/14, excision 7/24/14  6. Intradermal nevus, right shoulder, s/p shave bx 10/11/22  ______________________________________    Impression/Plan:  Eliceo was seen today for skin check.    Diagnoses and all orders for this visit:    AK (actinic keratosis)  -     DESTRUCT PREMALIGNANT LESION, FIRST  -     DESTRUCT PREMALIGNANT LESION, 2-14  3 aks on scalp  - see procedure note    History of nonmelanoma skin cancer  - No obvious evidence of recurrence at site of previous malignancy    Actinic skin damage  Lentigines  - discussed sunprotective behaviors, clothing, and the use of sunscreen      Cryotherapy procedure note: After verbal consent and discussion of risks and benefits including but no limited to dyspigmentation/scar, blister, and pain, 3x AKs on scalp was(were) treated with 1-2mm freeze border for 2 cycles with liquid nitrogen. Post cryotherapy instructions were provided.     Follow-up in 4-6 months; for FBSE tentatively previously scheduled for October already.     Staff & Resident Involved:  Pantera Orourke MD  Medicine-Dermatology, PGY-2  Pager: [phone]    Kunal  MD Frank   of Dermatology  Department of Dermatology  [facility] [employer] [facility]    CC:   Chief Complaint   Patient presents with    Skin Check     2 spots of concern on scalp. Patient states it has been spontaneously bleeding and scabbing.       History of Present Illness:  Mr. Eliceo Griggs is a 73 year old male who presents as a return patient. Noticed itchy, bleeding rough spots on the top of his head a couple months ago. 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 No additional skin concerns today.     Labs:  N/A    Physical exam:  Vitals: There were no vitals taken for this visit.  GEN: well developed, well-nourished, in no acute distress, in a pleasant mood.     SKIN: Irvin phototype II  - Waist-up skin, which includes the head/face, neck, both arms, chest, back, abdomen, digits and/or nails was examined.  - 3x rough, scaly papules on the scalp and forehead  - scattered red macules/papules on the upper extremities  - scattered pigmented macules  - scattered waxy, stuck on plaques on the trunk and upper extremities  - No other lesions of concern on areas examined.     Past Medical History:   Past Medical History:   Diagnosis Date    Basal cell carcinoma     Complication of anesthesia     a long time ago during knee surgery. Patient was nauseous for 3 days after surgery    Other specified malignant neoplasm of skin of trunk, except scrotum     Prostate infection     prostatitis    Squamous cell carcinoma      Past Surgical History:   Procedure Laterality Date    ADENOIDECTOMY      BIOPSY OF SKIN LESION      LITHOTRIPSY      MOHS MICROGRAPHIC PROCEDURE      PROSTATE BIOPSY, NEEDLE, SATURATION SAMPLING      TESTICLE SURGERY      TONSILLECTOMY      VASECTOMY         Social History:   reports that he has never smoked. He has never used smokeless tobacco. He reports current alcohol use. He reports that he does not use  drugs.    Family History:  Family History   Problem Relation Age of Onset    Cancer Mother         skin cancer    Cancer Father     Hypertension Father     Skin Cancer No family hx of         no skin cancer       Medications:  Current Outpatient Medications   Medication Sig Dispense Refill    aspirin 81 MG tablet Take 81 mg by mouth      atorvastatin (LIPITOR) 40 MG tablet Take 1/2 to 1 tab daily as instructed by cardiology      ferrous sulfate (FEROSUL) 325 (65 Fe) MG tablet Take 1 iron pill three times a week.      lisinopril (ZESTRIL) 40 MG tablet Take 40 mg by mouth daily      Multiple Vitamin (MULTIVITAMIN  S) CAPS Take 1 capsule by mouth       Allergies   Allergen Reactions    Penicillins Anaphylaxis    Erythromycin Rash    Sulfa Antibiotics Hives and Rash

## 2023-07-25 NOTE — PATIENT INSTRUCTIONS
Cryotherapy    What is it?  Use of a very cold liquid, such as liquid nitrogen, to freeze and destroy abnormal skin cells that need to be removed    What should I expect?  Tenderness and redness  A small blister that might grow and fill with dark purple blood. There may be crusting.  More than one treatment may be needed if the lesions do not go away.    How do I care for the treated area?  Gently wash the area with your hands when bathing.  Use a thin layer of Vaseline to help with healing. You may use a Band-Aid.   The area should heal within 7-10 days and may leave behind a pink or lighter color.   Do not use an antibiotic or Neosporin ointment.   You may take acetaminophen (Tylenol) for pain.     Call your doctor if you have:  Severe pain  Signs of infection (warmth, redness, cloudy yellow drainage, and or a bad smell)  Questions or concerns    Who should I call with questions?      [facility]: [phone]      [facility]: [phone]      For urgent needs outside of business hours call the [facility] at [phone] and ask for the dermatology resident on call

## 2023-07-25 NOTE — LETTER
7/25/2023       RE: Eliceo Griggs  [address] [place] [address]     Dear Colleague,    Thank you for referring your patient, Eliceo Griggs, to the [facility]MAT[facility] [place] at [facility]. Please see a copy of my visit note below.    [facility] Dermatology Note    Encounter Date: Jul 25, 2023    Dermatology Problem List:  1. NMSC  - BCC, right temple, s/p shave bx 10/11/22, s/p MMS 11/30/22 c/b seroma  - BCC, right chest, s/p shave bx 10/11/22, s/p WLE 11/30/22  - BCC Left central upper abdomen s/p excision 6/8/21  - BCC Left clavicle s/p excision 6/8/21  - BCC  Right lateral neck s/p MMS 6/8/21  - BCC, lower anterior neck, s/p excision 3/12/20  - BCC 01/2019 L clavicle, s/p imiquimod  - BCC 02/2016 x3 from right chest, right neck, and mid back s/p ED&C  - BCC 06/2014, right cheek s/p Mohs  2. Hypertrophic scar at prior ED&C site of superficial BCC from mid back s/p biopsy 4/14/16  3. Seborrheic dermatitis, eyebrows, stable  - Hydrocortisone 2.5% ointment PRN   4. AKs   -s/p cryo 7/25/23  5. Dysplastic nevus  - Right lower back, moderate dysplasia, biopsy 6/10/14, excision 7/24/14  6. Intradermal nevus, right shoulder, s/p shave bx 10/11/22  ______________________________________    Impression/Plan:  Eliceo was seen today for skin check.    Diagnoses and all orders for this visit:    AK (actinic keratosis)  -     DESTRUCT PREMALIGNANT LESION, FIRST  -     DESTRUCT PREMALIGNANT LESION, 2-14  3 aks on scalp  - see procedure note    History of nonmelanoma skin cancer  - No obvious evidence of recurrence at site of previous malignancy    Actinic skin damage  Lentigines  - discussed sunprotective behaviors, clothing, and the use of sunscreen      Cryotherapy procedure note: After verbal consent and discussion of risks and benefits including but no limited to dyspigmentation/scar, blister, and pain, 3x AKs on scalp  was(were) treated with 1-2mm freeze border for 2 cycles with liquid nitrogen. Post cryotherapy instructions were provided.     Follow-up in 4-6 months; for FBSE tentatively previously scheduled for October already.     Staff & Resident Involved:  Pantera Orourke MD  Medicine-Dermatology, PGY-2  Pager: [phone]    Kunal Marie MD   of Dermatology  Department of Dermatology  [facility]    CC:   Chief Complaint   Patient presents with    Skin Check     2 spots of concern on scalp. Patient states it has been spontaneously bleeding and scabbing.       History of Present Illness:  Mr. Eliceo Griggs is a 73 year old male who presents as a return patient. Noticed itchy, bleeding rough spots on the top of his head a couple months ago. 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 No additional skin concerns today.     Labs:  N/A    Physical exam:  Vitals: There were no vitals taken for this visit.  GEN: well developed, well-nourished, in no acute distress, in a pleasant mood.     SKIN: Irvin phototype II  - Waist-up skin, which includes the head/face, neck, both arms, chest, back, abdomen, digits and/or nails was examined.  - 3x rough, scaly papules on the scalp and forehead  - scattered red macules/papules on the upper extremities  - scattered pigmented macules  - scattered waxy, stuck on plaques on the trunk and upper extremities  - No other lesions of concern on areas examined.     Past Medical History:   Past Medical History:   Diagnosis Date    Basal cell carcinoma     Complication of anesthesia     a long time ago during knee surgery. Patient was nauseous for 3 days after surgery    Other specified malignant neoplasm of skin of trunk, except scrotum     Prostate infection     prostatitis    Squamous cell carcinoma      Past Surgical History:   Procedure Laterality Date    ADENOIDECTOMY      BIOPSY OF SKIN LESION       LITHOTRIPSY      MOHS MICROGRAPHIC PROCEDURE      PROSTATE BIOPSY, NEEDLE, SATURATION SAMPLING      TESTICLE SURGERY      TONSILLECTOMY      VASECTOMY         Social History:   reports that he has never smoked. He has never used smokeless tobacco. He reports current alcohol use. He reports that he does not use drugs.    Family History:  Family History   Problem Relation Age of Onset    Cancer Mother         skin cancer    Cancer Father     Hypertension Father     Skin Cancer No family hx of         no skin cancer       Medications:  Current Outpatient Medications   Medication Sig Dispense Refill    aspirin 81 MG tablet Take 81 mg by mouth      atorvastatin (LIPITOR) 40 MG tablet Take 1/2 to 1 tab daily as instructed by cardiology      ferrous sulfate (FEROSUL) 325 (65 Fe) MG tablet Take 1 iron pill three times a week.      lisinopril (ZESTRIL) 40 MG tablet Take 40 mg by mouth daily      Multiple Vitamin (MULTIVITAMIN  S) CAPS Take 1 capsule by mouth       Allergies   Allergen Reactions    Penicillins Anaphylaxis    Erythromycin Rash    Sulfa Antibiotics Hives and Rash

## 2023-07-26 ENCOUNTER — TELEPHONE (OUTPATIENT)
Dept: DERMATOLOGY | Facility: CLINIC | Age: 73
End: 2023-07-26
Payer: COMMERCIAL

## 2023-07-26 NOTE — TELEPHONE ENCOUNTER
Left Voicemail (1st Attempt) and Sent Mychart (1st Attempt) for the patient to call back and schedule the following:    Appointment type: Return  Provider: Dr. Marie  Return date: November 2023  Specialty phone number: [phone]

## 2023-07-27 NOTE — TELEPHONE ENCOUNTER
[facility] Call Center    Phone Message    May a detailed message be left on voicemail: yes     Reason for Call: Other: Pt called back to schedule a 4 month F/U Appt with Dr Marie in November     There are no return openings with Dr MELBA Marie in [place] until middle of January and Pt leaves [place] for the winter    Please call Pt back to schedule    Best time to reach him is in the morning    Thank you!      Action Taken: Message routed to:  [facility] DERM    Travel Screening: Not Applicable

## 2023-07-28 ENCOUNTER — TELEPHONE (OUTPATIENT)
Dept: DERMATOLOGY | Facility: CLINIC | Age: 73
End: 2023-07-28
Payer: COMMERCIAL

## 2023-07-28 NOTE — TELEPHONE ENCOUNTER
Via phone patient was scheduled for the following:    Appointment type: Return  Provider: Dr. Marie  Return date: 11-21-23  Specialty phone number: [phone]

## 2023-11-21 ENCOUNTER — OFFICE VISIT (OUTPATIENT)
Dept: DERMATOLOGY | Facility: CLINIC | Age: 73
End: 2023-11-21
Payer: COMMERCIAL

## 2023-11-21 DIAGNOSIS — L57.0 ACTINIC KERATOSIS: Primary | ICD-10-CM

## 2023-11-21 DIAGNOSIS — Z85.828 HISTORY OF BASAL CELL CARCINOMA: ICD-10-CM

## 2023-11-21 DIAGNOSIS — L57.8 ACTINIC SKIN DAMAGE: ICD-10-CM

## 2023-11-21 DIAGNOSIS — L21.9 DERMATITIS, SEBORRHEIC: ICD-10-CM

## 2023-11-21 PROCEDURE — 17003 DESTRUCT PREMALG LES 2-14: CPT | Performed by: STUDENT IN AN ORGANIZED HEALTH CARE EDUCATION/TRAINING PROGRAM

## 2023-11-21 PROCEDURE — 17000 DESTRUCT PREMALG LESION: CPT | Performed by: STUDENT IN AN ORGANIZED HEALTH CARE EDUCATION/TRAINING PROGRAM

## 2023-11-21 PROCEDURE — 99214 OFFICE O/P EST MOD 30 MIN: CPT | Mod: 25 | Performed by: STUDENT IN AN ORGANIZED HEALTH CARE EDUCATION/TRAINING PROGRAM

## 2023-11-21 RX ORDER — KETOCONAZOLE 20 MG/G
CREAM TOPICAL DAILY
Qty: 60 G | Refills: 3 | Status: SHIPPED | OUTPATIENT
Start: 2023-11-21

## 2023-11-21 NOTE — NURSING NOTE
Dermatology Rooming Note    Eliceo Griggs's goals for this visit include:   Chief Complaint   Patient presents with    Derm Problem     Lesions of concern on scalp.     Brandon Thompson, EMT-B

## 2023-11-21 NOTE — PROGRESS NOTES
[facility] Dermatology Note    Encounter Date: Nov 21, 2023    Dermatology Problem List:  1. NMSC  - BCC, right temple, s/p shave bx 10/11/22, s/p MMS 11/30/22 c/b seroma  - BCC, right chest, s/p shave bx 10/11/22, s/p WLE 11/30/22  - BCC Left central upper abdomen s/p excision 6/8/21  - BCC Left clavicle s/p excision 6/8/21  - BCC  Right lateral neck s/p MMS 6/8/21  - BCC, lower anterior neck, s/p excision 3/12/20  - BCC 01/2019 L clavicle, s/p imiquimod  - BCC 02/2016 x3 from right chest, right neck, and mid back s/p ED&C  - BCC 06/2014, right cheek s/p Mohs  2. Hypertrophic scar at prior ED&C site of superficial BCC from mid back s/p biopsy 4/14/16  3. Seborrheic dermatitis, eyebrows, stable  - Hydrocortisone 2.5% ointment PRN   4. AKs              -s/p cryo 7/25/23  5. Dysplastic nevus  - Right lower back, moderate dysplasia, biopsy 6/10/14, excision 7/24/14  6. Intradermal nevus, right shoulder, s/p shave bx 10/11/22    Major PMHx  -   ______________________________________    Impression/Plan:  Eliceo was seen today for derm problem.    Diagnoses and all orders for this visit:    Actinic keratosis  -     DESTRUCT PREMALIGNANT LESION, FIRST  -     DESTRUCT PREMALIGNANT LESION, 2-14  - 2 aks on scalp  - see procedure note    Dermatitis, seborrheic  -     ketoconazole (NIZORAL) 2 % external cream; Apply topically daily  - NLF and eyebros  - apply BID    Actinic skin damage  - Reviewed the compounding benefits of incremental changes to sun protective clothing behaviors including increased frequency of sunscreen and sun protective clothing like broad brimmed hats and longsleeved UPF containing clothing    History of basal cell carcinoma  - No obvious evidence of recurrence at site of previous malignancy    Other orders  -     Adult Dermatology Clinic Follow-Up Order (Lottie); Future        Cryotherapy procedure note: After verbal consent and discussion of risks and benefits including but no  limited to dyspigmentation/scar, blister, and pain, 2 aks on scalp was(were) treated with 1-2mm freeze border for 2 cycles with liquid nitrogen. Post cryotherapy instructions were provided.     Follow-up in 1 year .       Staff Involved:  Staff Only    Kunal Marie MD   of Dermatology  Department of Dermatology  [facility]      CC:   Chief Complaint   Patient presents with    Derm Problem     Lesions of concern on scalp.       History of Present Illness:  Mr. Eliceo Griggs is a 73 year old male who presents as a return patient.    Last seen in July at that time aks were frozen.  Today is concerned about some scaly spots on his scalp.  Prefers to have them frozen.  Also having some scaling in the brow and the nasolabial fold interested in treatment for this    Labs:      Physical exam:  Vitals: There were no vitals taken for this visit.  GEN: well developed, well-nourished, in no acute distress, in a pleasant mood.     SKIN: Irvin phototype 1  - Full skin, which includes the head/face, both arms, chest, back, abdomen,both legs, genitalia and/or groin buttocks, digits and/or nails, was examined.  - gritty pink papules on scalp  - greasy scaling of NLF and brows  - No obvious evidence of recurrence at site of previous malignancy  - No other lesions of concern on areas examined.     Past Medical History:   Past Medical History:   Diagnosis Date    Basal cell carcinoma     Complication of anesthesia     a long time ago during knee surgery. Patient was nauseous for 3 days after surgery    Other specified malignant neoplasm of skin of trunk, except scrotum     Prostate infection     prostatitis    Squamous cell carcinoma      Past Surgical History:   Procedure Laterality Date    ADENOIDECTOMY      BIOPSY OF SKIN LESION      LITHOTRIPSY      MOHS MICROGRAPHIC PROCEDURE      PROSTATE BIOPSY, NEEDLE, SATURATION SAMPLING      TESTICLE SURGERY      TONSILLECTOMY       VASECTOMY         Social History:   reports that he has never smoked. He has never used smokeless tobacco. He reports current alcohol use. He reports that he does not use drugs.    Family History:  Family History   Problem Relation Age of Onset    Cancer Mother         skin cancer    Cancer Father     Hypertension Father     Skin Cancer No family hx of         no skin cancer       Medications:  Current Outpatient Medications   Medication Sig Dispense Refill    aspirin 81 MG tablet Take 81 mg by mouth      atorvastatin (LIPITOR) 40 MG tablet Take 1/2 to 1 tab daily as instructed by cardiology      ferrous sulfate (FEROSUL) 325 (65 Fe) MG tablet Take 1 iron pill three times a week.      ketoconazole (NIZORAL) 2 % external cream Apply topically daily 60 g 3    lisinopril (ZESTRIL) 40 MG tablet Take 40 mg by mouth daily      Multiple Vitamin (MULTIVITAMIN  S) CAPS Take 1 capsule by mouth       Allergies   Allergen Reactions    Penicillins Anaphylaxis    Erythromycin Rash    Sulfa Antibiotics Hives and Rash

## 2023-11-21 NOTE — PATIENT INSTRUCTIONS
Apply ketoconazole cream twice daily for 2 weeks, continue 3x weekly as maintenance    CRYOTHERAPY    What is it?  Use of a very cold liquid, such as liquid nitrogen, to freeze and destroy abnormal skin cells that need to be removed    What should I expect?  Tenderness and redness  A small blister that might grow and fill with dark purple blood.  More than one treatment may be needed if the lesions do not go away.    How do I care for the treated area?  Gently wash the area with your hands when bathing.  Use a thin layer of VaselineÆ to help with healing.   The area should heal within 7-10 days.  Do not use an antibiotic or NeosporinÆ ointment.   You may take acetaminophen (TylenolÆ) for pain.     Call your Doctor if you have:  Severe pain  Signs of infection (warmth, redness, cloudy yellow drainage, and or a bad smell)  Questions or concerns

## 2023-11-21 NOTE — LETTER
11/21/2023       RE: Eliceo Griggs  [address] [place] [address]     Dear Colleague,    Thank you for referring your patient, Eliceo Griggs, to the [facility] [place] at [facility]. Please see a copy of my visit note below.    [facility] Dermatology Note    Encounter Date: Nov 21, 2023    Dermatology Problem List:  1. NMSC  - BCC, right temple, s/p shave bx 10/11/22, s/p MMS 11/30/22 c/b seroma  - BCC, right chest, s/p shave bx 10/11/22, s/p WLE 11/30/22  - BCC Left central upper abdomen s/p excision 6/8/21  - BCC Left clavicle s/p excision 6/8/21  - BCC  Right lateral neck s/p MMS 6/8/21  - BCC, lower anterior neck, s/p excision 3/12/20  - BCC 01/2019 L clavicle, s/p imiquimod  - BCC 02/2016 x3 from right chest, right neck, and mid back s/p ED&C  - BCC 06/2014, right cheek s/p Mohs  2. Hypertrophic scar at prior ED&C site of superficial BCC from mid back s/p biopsy 4/14/16  3. Seborrheic dermatitis, eyebrows, stable  - Hydrocortisone 2.5% ointment PRN   4. AKs              -s/p cryo 7/25/23  5. Dysplastic nevus  - Right lower back, moderate dysplasia, biopsy 6/10/14, excision 7/24/14  6. Intradermal nevus, right shoulder, s/p shave bx 10/11/22    Major PMHx  -   ______________________________________    Impression/Plan:  Eliceo was seen today for derm problem.    Diagnoses and all orders for this visit:    Actinic keratosis  -     DESTRUCT PREMALIGNANT LESION, FIRST  -     DESTRUCT PREMALIGNANT LESION, 2-14  - 2 aks on scalp  - see procedure note    Dermatitis, seborrheic  -     ketoconazole (NIZORAL) 2 % external cream; Apply topically daily  - NLF and eyebros  - apply BID    Actinic skin damage  - Reviewed the compounding benefits of incremental changes to sun protective clothing behaviors including increased frequency of sunscreen and sun protective clothing like broad brimmed hats and longsleeved UPF  containing clothing    History of basal cell carcinoma  - No obvious evidence of recurrence at site of previous malignancy    Other orders  -     Adult Dermatology Clinic Follow-Up Order (Blank); Future        Cryotherapy procedure note: After verbal consent and discussion of risks and benefits including but no limited to dyspigmentation/scar, blister, and pain, 2 aks on scalp was(were) treated with 1-2mm freeze border for 2 cycles with liquid nitrogen. Post cryotherapy instructions were provided.     Follow-up in 1 year .       Staff Involved:  Staff Only    Kunal Marie MD   of Dermatology  Department of Dermatology  [facility]      CC:   Chief Complaint   Patient presents with    Derm Problem     Lesions of concern on scalp.       History of Present Illness:  Mr. Eliceo Griggs is a 73 year old male who presents as a return patient.    Last seen in July at that time aks were frozen.  Today is concerned about some scaly spots on his scalp.  Prefers to have them frozen.  Also having some scaling in the brow and the nasolabial fold interested in treatment for this    Labs:      Physical exam:  Vitals: There were no vitals taken for this visit.  GEN: well developed, well-nourished, in no acute distress, in a pleasant mood.     SKIN: Irvin phototype 1  - Full skin, which includes the head/face, both arms, chest, back, abdomen,both legs, genitalia and/or groin buttocks, digits and/or nails, was examined.  - gritty pink papules on scalp  - greasy scaling of NLF and brows  - No obvious evidence of recurrence at site of previous malignancy  - No other lesions of concern on areas examined.     Past Medical History:   Past Medical History:   Diagnosis Date    Basal cell carcinoma     Complication of anesthesia     a long time ago during knee surgery. Patient was nauseous for 3 days after surgery    Other specified malignant neoplasm of skin of trunk, except scrotum      Prostate infection     prostatitis    Squamous cell carcinoma      Past Surgical History:   Procedure Laterality Date    ADENOIDECTOMY      BIOPSY OF SKIN LESION      LITHOTRIPSY      MOHS MICROGRAPHIC PROCEDURE      PROSTATE BIOPSY, NEEDLE, SATURATION SAMPLING      TESTICLE SURGERY      TONSILLECTOMY      VASECTOMY         Social History:   reports that he has never smoked. He has never used smokeless tobacco. He reports current alcohol use. He reports that he does not use drugs.    Family History:  Family History   Problem Relation Age of Onset    Cancer Mother         skin cancer    Cancer Father     Hypertension Father     Skin Cancer No family hx of         no skin cancer       Medications:  Current Outpatient Medications   Medication Sig Dispense Refill    aspirin 81 MG tablet Take 81 mg by mouth      atorvastatin (LIPITOR) 40 MG tablet Take 1/2 to 1 tab daily as instructed by cardiology      ferrous sulfate (FEROSUL) 325 (65 Fe) MG tablet Take 1 iron pill three times a week.      ketoconazole (NIZORAL) 2 % external cream Apply topically daily 60 g 3    lisinopril (ZESTRIL) 40 MG tablet Take 40 mg by mouth daily      Multiple Vitamin (MULTIVITAMIN  S) CAPS Take 1 capsule by mouth       Allergies   Allergen Reactions    Penicillins Anaphylaxis    Erythromycin Rash    Sulfa Antibiotics Hives and Rash

## 2023-11-29 ENCOUNTER — TELEPHONE (OUTPATIENT)
Dept: DERMATOLOGY | Facility: CLINIC | Age: 73
End: 2023-11-29
Payer: COMMERCIAL

## 2023-11-29 NOTE — TELEPHONE ENCOUNTER
Left Voicemail (1st Attempt) and Sent Mychart (1st Attempt) for the patient to call back and schedule the following:    Appointment type: Return  Provider: Dr. Marie  Return date: May 2024  Specialty phone number: [phone]

## 2023-12-05 ENCOUNTER — TELEPHONE (OUTPATIENT)
Dept: DERMATOLOGY | Facility: CLINIC | Age: 73
End: 2023-12-05
Payer: COMMERCIAL

## 2023-12-05 NOTE — TELEPHONE ENCOUNTER
Via phone patient states he will call to schedule the following:    Appointment type: Return  Provider: Dr. Marie  Return date:   Specialty phone number: [phone]

## 2024-06-22 ENCOUNTER — HEALTH MAINTENANCE LETTER (OUTPATIENT)
Age: 74
End: 2024-06-22

## 2024-08-09 ENCOUNTER — TRANSFERRED RECORDS (OUTPATIENT)
Dept: HEALTH INFORMATION MANAGEMENT | Facility: CLINIC | Age: 74
End: 2024-08-09
Payer: COMMERCIAL

## 2024-08-09 ENCOUNTER — TRANSCRIBE ORDERS (OUTPATIENT)
Dept: OTHER | Age: 74
End: 2024-08-09

## 2024-08-09 DIAGNOSIS — H02.839 DERMATOCHALASIS: ICD-10-CM

## 2024-08-09 DIAGNOSIS — H57.819 BROW PTOSIS: Primary | ICD-10-CM

## 2024-08-15 NOTE — TELEPHONE ENCOUNTER
FUTURE VISIT INFORMATION      FUTURE VISIT INFORMATION:  Date: 11/18/24  Time: 9:00am  Location: [facility]  REFERRAL INFORMATION:  Referring provider:  Corby Branham MD   Referring providers clinic:  [facility]   Reason for visit/diagnosis  Brow ptosis Dermatochalasis     RECORDS REQUESTED FROM:       Clinic name Comments Records Status Imaging Status   [facility]  Recs scanned into chart under 8/9/24 epic

## 2024-11-14 ENCOUNTER — TELEPHONE (OUTPATIENT)
Dept: OPHTHALMOLOGY | Facility: CLINIC | Age: 74
End: 2024-11-14
Payer: COMMERCIAL

## 2024-11-18 ENCOUNTER — PRE VISIT (OUTPATIENT)
Dept: OPHTHALMOLOGY | Facility: CLINIC | Age: 74
End: 2024-11-18

## 2024-11-18 ENCOUNTER — OFFICE VISIT (OUTPATIENT)
Dept: OPHTHALMOLOGY | Facility: CLINIC | Age: 74
End: 2024-11-18
Attending: OPHTHALMOLOGY
Payer: COMMERCIAL

## 2024-11-18 DIAGNOSIS — H02.839 DERMATOCHALASIS: Primary | ICD-10-CM

## 2024-11-18 DIAGNOSIS — H57.819 BROW PTOSIS: ICD-10-CM

## 2024-11-18 PROCEDURE — 92082 INTERMEDIATE VISUAL FIELD XM: CPT | Mod: GC | Performed by: OPHTHALMOLOGY

## 2024-11-18 PROCEDURE — 99204 OFFICE O/P NEW MOD 45 MIN: CPT | Mod: GC | Performed by: OPHTHALMOLOGY

## 2024-11-18 PROCEDURE — 92285 EXTERNAL OCULAR PHOTOGRAPHY: CPT | Mod: GC | Performed by: OPHTHALMOLOGY

## 2024-11-18 RX ORDER — AMLODIPINE BESYLATE 5 MG/1
5 TABLET ORAL
COMMUNITY
Start: 2022-12-22

## 2024-11-18 ASSESSMENT — CONF VISUAL FIELD
METHOD: COUNTING FINGERS
OS_INFERIOR_NASAL_RESTRICTION: 0
OS_INFERIOR_TEMPORAL_RESTRICTION: 0
OD_SUPERIOR_TEMPORAL_RESTRICTION: 0
OD_NORMAL: 1
OD_INFERIOR_TEMPORAL_RESTRICTION: 0
OS_NORMAL: 1
OD_INFERIOR_NASAL_RESTRICTION: 0
OS_SUPERIOR_NASAL_RESTRICTION: 0
OD_SUPERIOR_NASAL_RESTRICTION: 0
OS_SUPERIOR_TEMPORAL_RESTRICTION: 0

## 2024-11-18 ASSESSMENT — VISUAL ACUITY
OS_CC: 20/40
METHOD: SNELLEN - LINEAR
OD_CC+: -2
CORRECTION_TYPE: GLASSES
OD_CC: 20/25

## 2024-11-18 ASSESSMENT — EXTERNAL EXAM - RIGHT EYE: OD_EXAM: BROW PTOSIS

## 2024-11-18 ASSESSMENT — TONOMETRY
OS_IOP_MMHG: 13
IOP_METHOD: ICARE
OD_IOP_MMHG: 13

## 2024-11-18 ASSESSMENT — EXTERNAL EXAM - LEFT EYE: OS_EXAM: BROW PTOSIS

## 2024-11-18 ASSESSMENT — SLIT LAMP EXAM - LIDS
COMMENTS: DERMATOCHALASIS WITH LATERAL HOODING
COMMENTS: DERMATOCHALASIS WITH LATERAL HOODING

## 2024-11-18 NOTE — NURSING NOTE
Chief Complaints and History of Present Illnesses   Patient presents with    Consult For     Dermatochalasis referred by Dr Branham     Chief Complaint(s) and History of Present Illness(es)       Consult For              Laterality: both eyes    Course: gradually worsening    Associated symptoms: Negative for dryness, eye pain, tearing and discharge    Treatments tried: no treatments    Pain scale: 0/10    Comments: Dermatochalasis referred by Dr Branham              Comments    Patient states that for the past 20 years his eye doctors have been concerned about his lids drooping.  He uses his brows to lift his lids.  He has had gradually more hooding from his upper lids, which is decreasing his temporal peripheral vision.      Paulette Shen, COT 8:51 AM  November 18, 2024

## 2024-11-18 NOTE — PROGRESS NOTES
Chief Complaints and History of Present Illnesses   Patient presents with    Consult For     Dermatochalasis referred by Dr Branham     Chief Complaint(s) and History of Present Illness(es)     Consult For    In both eyes.  Since onset it is gradually worsening.  Associated symptoms   include Negative for dryness, eye pain, tearing and discharge.  Treatments   tried include no treatments.  Pain was noted as 0/10. Additional comments:   Dermatochalasis referred by Dr Branham           Comments    Patient states that for the past 20 years his eye doctors have been   concerned about his lids drooping.  He uses his brows to lift his lids.    He has had gradually more hooding from his upper lids, which is decreasing   his temporal peripheral vision.      Paulette Shen, COT 8:51 AM  November 18, 2024     Patient was seen by Dr. Bermudez earlier this year for ptosis evaluation, and he was told insurance would only cover part of the surgery. He was seen by his regular ophthalmologist Dr. Branham who recommend evaluation at [facility]. Patient is not sure which part of the surgery was denied vs. accepted.             FUNCTIONAL COMPLAINTS RELATED TO DROOPY EYELIDS/BROWS:  Eliceo Griggs describes upper lids interfering with superior visual field and interfering with activities of daily living including reading, driving and watching television.     EXAM:   Dominant eye right eye    MRD1: Right eye 6mm   Left eye 6mm  MRD2: Right eye 5mm   Left eye 5mm  Levator function: Right eye 15mm   Left eye 15mm  Dermatochalasis with excess skin touching eyelashes   Brow ptosis with brow resting below superior orbital rim     VISUAL FIELD:  Right eye untaped:20 degrees Right eye taped:50 degrees  Left eye untaped:20 degrees Left eye taped:50 degrees    Right eye visual field improves by: 30 degrees  Left eye visual field improves by: 30 degrees    Lower lid evaluation  Vector: Down  Laxity: mild right eye mild left eye   Fat pads: -herniation  right eye -herniation left eye  Tear trough: -right eye -left eye  Skin excess: + right eye +left eye    Assessment & Plan     Eliceo Griggs is a 74 year old male with the following diagnoses:   1. Dermatochalasis    2. Brow ptosis         Bilateral upper blepharoplasty - skin   Bilateral brow ptosis repair (internal browlift) - Brow ptosis repair is being performed to support and lift the brows which are resting below the orbital rim and to prevent post blepharoplasty brow descent which will make the lid position worse.      Pt is on aspirin. No MI or stroke but has coronary artery stent. No anticoagulation.           Anurag Lilly MD  PGY-2 Resident  Department of Ophthalmology  November 18, 2024 9:25 AM    Attending Physician Attestation:  I have seen and examined this patient with the resident .  I have confirmed and edited as necessary the chief complaint(s), history of present illness, review of systems, relevant history, and examination findings as documented by others.  I have personally reviewed the relevant tests, images, and reports as documented above.  I have confirmed and edited as necessary the assessment and plan and agree with this note.    - Eliceo Robbnis MD 9:29 AM 11/18/2024  Today with Eliceo Griggs, I reviewed the indications, risks, benefits, and alternatives of the proposed surgical procedure including, but not limited to, failure obtain the desired result  and need for additional surgery, bleeding, infection, loss of vision, loss of the eye, and the remote possibility of permanent damage to any organ system or death with the use of anesthesia.  I provided multiple opportunities for the questions, answered all questions to the best of my ability, and confirmed that my answers and my discussion were understood.     - Eliceo Robbins MD 9:29 AM 11/18/2024

## 2024-11-18 NOTE — LETTER
2024         RE:  :  MRN: Eliceo Griggs  1950  [id number]     Dear ,    Thank you for asking me to see your patient, Eliceo Griggs, for an oculoplastic   consultation.  My assessment and plan are below.  For further details, please see my attached clinic note.        Assessment & Plan     Eliceo Griggs is a 74 year old male with the following diagnoses:   1. Dermatochalasis    2. Brow ptosis         Bilateral upper blepharoplasty   Bilateral brow ptosis repair - Brow ptosis repair is being performed to support and lift the brows which are resting below the orbital rim and to prevent post blepharoplasty brow descent which will make the lid position worse.        Again, thank you for allowing me to participate in the care of your patient.      Sincerely,    Eliceo Robbins MD  Department of Ophthalmology and Visual Neurosciences  [facility]    CC: Corby Branham MD  [facility]   [facility]  [address] [address]  Via Fax: [phone]     Bull Flowers MD  [address] [facility] [address]  [facility] [address]  Via In Basket    René Samaniego MD  [address] [facility] [address]  [facility] [address]  Via In Basket    Deb Harris, APRN CNP  [address]  [place] [address]  Via In Basket    Kunal Marie MD  [address]  [facility] [address]  Via In Basket

## 2025-03-02 ENCOUNTER — HEALTH MAINTENANCE LETTER (OUTPATIENT)
Age: 75
End: 2025-03-02

## 2025-03-27 ENCOUNTER — OFFICE VISIT (OUTPATIENT)
Dept: DERMATOLOGY | Facility: CLINIC | Age: 75
End: 2025-03-27
Payer: COMMERCIAL

## 2025-03-27 DIAGNOSIS — L82.1 SEBORRHEIC KERATOSES: ICD-10-CM

## 2025-03-27 DIAGNOSIS — L82.0 INFLAMED SEBORRHEIC KERATOSIS: ICD-10-CM

## 2025-03-27 DIAGNOSIS — L57.8 ACTINIC SKIN DAMAGE: ICD-10-CM

## 2025-03-27 DIAGNOSIS — L57.0 ACTINIC KERATOSIS: Primary | ICD-10-CM

## 2025-03-27 DIAGNOSIS — L21.9 DERMATITIS, SEBORRHEIC: ICD-10-CM

## 2025-03-27 RX ORDER — KETOCONAZOLE 20 MG/G
CREAM TOPICAL DAILY
Qty: 60 G | Refills: 3 | Status: SHIPPED | OUTPATIENT
Start: 2025-03-27

## 2025-03-27 ASSESSMENT — PAIN SCALES - GENERAL: PAINLEVEL_OUTOF10: NO PAIN (0)

## 2025-03-27 NOTE — NURSING NOTE
Dermatology Rooming Note    Eliceo Griggs's goals for this visit include:   Chief Complaint   Patient presents with    Derm Problem     Eliceo is here today for a FBSE; Spot of concern on left temple and dry spots behind ears.     Omari Palomares, Visit Facilitator

## 2025-03-27 NOTE — PROGRESS NOTES
[facility] Dermatology Note    Encounter Date: Mar 27, 2025    Dermatology Problem List:  3/27/2025  1. NMSC  - BCC, right temple, s/p shave bx 10/11/22, s/p MMS 11/30/22 c/b seroma  - BCC, right chest, s/p shave bx 10/11/22, s/p WLE 11/30/22  - BCC Left central upper abdomen s/p excision 6/8/21  - BCC Left clavicle s/p excision 6/8/21  - BCC  Right lateral neck s/p MMS 6/8/21  - BCC, lower anterior neck, s/p excision 3/12/20  - BCC 01/2019 L clavicle, s/p imiquimod  - BCC 02/2016 x3 from right chest, right neck, and mid back s/p ED&C  - BCC 06/2014, right cheek s/p Mohs  2. Hypertrophic scar at prior ED&C site of superficial BCC from mid back s/p biopsy 4/14/16  3. Seborrheic dermatitis, eyebrows, stable  - ketoconazole cream bid prn  4. AKs              -s/p cryo 7/25/23  5. Dysplastic nevus  - Right lower back, moderate dysplasia, biopsy 6/10/14, excision 7/24/14  6. Intradermal nevus, right shoulder, s/p shave bx 10/11/22    __________________________________    Impression/Plan:  Eliceo was seen today for derm problem.    # Benign skin findings include seborrheic keratosis, lentigines, skin tags, cherry angiomas, and sebaceous hyperplasia  * chronic uncomplicated  - lesions benign nature, no treatment is indicated at this time, will monitor for any clinical changes    # Seborrheic keratoses, inflamed  * chronic uncomplicated  - location: L temple  - number: 1  - benign nature reviewed  - cryotherapy perormed (see procedure note)     # Actinic keratoses  * chronic uncomplicated  - location(s): scalp  - number: 2  - cryotherapy performed (see procedure note)  - sun protection reviewed with SPF30+ and sun protective clothing    #Seb derm  - continue hydrocortisone BID PRN     # Lesion to monitor  R frontal scalp. Excoriated pink papule, flat. Central erosion. Non tender. Favor trauma over BCC. Schedule follow-up in 6 months to recheck, or patient will send message to schedule if lesion becomes  painful, tender, or bleeds spontaneously.  - follow-up in 6 months, possible bcc      Cryotherapy procedure note: After verbal consent and discussion of risks and benefits including but no limited to dyspigmentation/scar, blister, and pain, 2 aks on scalp and 1 ISK L temple was(were) treated with 1-2mm freeze border for 2 cycles with liquid nitrogen. Post cryotherapy instructions were provided.     Follow-up in 6 months for spot check.    Staff and Resident:    Abby VALERIO PGY3, saw and staffed this patient with the attending. All recommendations, therapies and procedures were pre-staffed with the attending or administered with direct supervision.         CC:   Chief Complaint   Patient presents with    Derm Problem     Eliceo is here today for a FBSE; Spot of concern on left temple and dry spots behind ears.       History of Present Illness:  Mr. Eliceo Griggs is a 75 year old male who presents as a return patient.    Today is concerned about some scaly spots on his scalp.  Prefers to have them frozen.  Also having some scaling in the brow and the nasolabial fold, chronic.    [place]    Has 4 grandkids - 7, 5, 3, 7 months    Labs:      Physical exam:  Vitals: There were no vitals taken for this visit.  GEN: well developed, well-nourished, in no acute distress, in a pleasant mood.     SKIN: Irvin phototype 1  - Full skin, which includes the head/face, both arms, chest, back, abdomen,both legs, genitalia and/or groin buttocks, digits and/or nails, was examined.  - gritty pink papules on scalp  - greasy scaling of NLF and brows  - R scalp with 3 mm pink macule (flat papule? Slightly shiny) w central erosion and surrounding telangiectasias  - No obvious evidence of recurrence at site of previous malignancy  - No other lesions of concern on areas examined.     Past Medical History:   Past Medical History:   Diagnosis Date    Basal cell carcinoma     Complication of anesthesia     a long time  ago during knee surgery. Patient was nauseous for 3 days after surgery    Other specified malignant neoplasm of skin of trunk, except scrotum     Prostate infection     prostatitis    Squamous cell carcinoma      Past Surgical History:   Procedure Laterality Date    ADENOIDECTOMY      BIOPSY OF SKIN LESION      LITHOTRIPSY      MOHS MICROGRAPHIC PROCEDURE      PROSTATE BIOPSY, NEEDLE, SATURATION SAMPLING      TESTICLE SURGERY      TONSILLECTOMY      VASECTOMY         Social History:   reports that he has never smoked. He has never used smokeless tobacco. He reports current alcohol use. He reports that he does not use drugs.    Family History:  Family History   Problem Relation Age of Onset    Cancer Mother         skin cancer    Cancer Father     Hypertension Father     Glaucoma Other     Skin Cancer No family hx of         no skin cancer       Medications:  Current Outpatient Medications   Medication Sig Dispense Refill    amLODIPine (NORVASC) 5 MG tablet Take 5 mg by mouth.      aspirin 81 MG tablet Take 81 mg by mouth      atorvastatin (LIPITOR) 40 MG tablet Take 40 mg by mouth daily.      ferrous sulfate (FEROSUL) 325 (65 Fe) MG tablet Take 325 mg by mouth three times a week.      lisinopril (ZESTRIL) 40 MG tablet Take 40 mg by mouth daily      Multiple Vitamin (MULTIVITAMIN  S) CAPS Take 1 capsule by mouth      ketoconazole (NIZORAL) 2 % external cream Apply topically daily 60 g 3     Allergies   Allergen Reactions    Penicillins Anaphylaxis    Erythromycin Rash    Sulfa Antibiotics Hives and Rash

## 2025-03-27 NOTE — LETTER
3/27/2025       RE: Eliceo Griggs  [address] [place] [address]     Dear Colleague,    Thank you for referring your patient, Eliceo Griggs, to the [facility] [place] [facility]. Please see a copy of my visit note below.    [facility] Dermatology Note    Encounter Date: Mar 27, 2025    Dermatology Problem List:  3/27/2025  1. NMSC  - BCC, right temple, s/p shave bx 10/11/22, s/p MMS 11/30/22 c/b seroma  - BCC, right chest, s/p shave bx 10/11/22, s/p WLE 11/30/22  - BCC Left central upper abdomen s/p excision 6/8/21  - BCC Left clavicle s/p excision 6/8/21  - BCC  Right lateral neck s/p MMS 6/8/21  - BCC, lower anterior neck, s/p excision 3/12/20  - BCC 01/2019 L clavicle, s/p imiquimod  - BCC 02/2016 x3 from right chest, right neck, and mid back s/p ED&C  - BCC 06/2014, right cheek s/p Mohs  2. Hypertrophic scar at prior ED&C site of superficial BCC from mid back s/p biopsy 4/14/16  3. Seborrheic dermatitis, eyebrows, stable  - ketoconazole cream bid prn  4. AKs              -s/p cryo 7/25/23  5. Dysplastic nevus  - Right lower back, moderate dysplasia, biopsy 6/10/14, excision 7/24/14  6. Intradermal nevus, right shoulder, s/p shave bx 10/11/22    __________________________________    Impression/Plan:  Eliceo was seen today for derm problem.    # Benign skin findings include seborrheic keratosis, lentigines, skin tags, cherry angiomas, and sebaceous hyperplasia  * chronic uncomplicated  - lesions benign nature, no treatment is indicated at this time, will monitor for any clinical changes    # Seborrheic keratoses, inflamed  * chronic uncomplicated  - location: L temple  - number: 1  - benign nature reviewed  - cryotherapy perormed (see procedure note)     # Actinic keratoses  * chronic uncomplicated  - location(s): scalp  - number: 2  - cryotherapy performed (see procedure note)  - sun protection  reviewed with SPF30+ and sun protective clothing    #Seb derm  - continue hydrocortisone BID PRN     # Lesion to monitor  R frontal scalp. Excoriated pink papule, flat. Central erosion. Non tender. Favor trauma over BCC. Schedule follow-up in 6 months to recheck, or patient will send message to schedule if lesion becomes painful, tender, or bleeds spontaneously.  - follow-up in 6 months, possible bcc      Cryotherapy procedure note: After verbal consent and discussion of risks and benefits including but no limited to dyspigmentation/scar, blister, and pain, 2 aks on scalp and 1 ISK L temple was(were) treated with 1-2mm freeze border for 2 cycles with liquid nitrogen. Post cryotherapy instructions were provided.     Follow-up in 6 months for spot check.    Staff and Resident:    Abby VALERIO PGY3, saw and staffed this patient with the attending. All recommendations, therapies and procedures were pre-staffed with the attending or administered with direct supervision.         CC:   Chief Complaint   Patient presents with     Derm Problem     Eliceo is here today for a FBSE; Spot of concern on left temple and dry spots behind ears.       History of Present Illness:  Mr. Eliceo Griggs is a 75 year old male who presents as a return patient.    Today is concerned about some scaly spots on his scalp.  Prefers to have them frozen.  Also having some scaling in the brow and the nasolabial fold, chronic.    [place].    Has 4 grandkids - 7, 5, 3, 7 months    Labs:      Physical exam:  Vitals: There were no vitals taken for this visit.  GEN: well developed, well-nourished, in no acute distress, in a pleasant mood.     SKIN: Irvin phototype 1  - Full skin, which includes the head/face, both arms, chest, back, abdomen,both legs, genitalia and/or groin buttocks, digits and/or nails, was examined.  - gritty pink papules on scalp  - greasy scaling of NLF and brows  - R scalp with 3 mm pink macule (flat papule?  Slightly shiny) w central erosion and surrounding telangiectasias  - No obvious evidence of recurrence at site of previous malignancy  - No other lesions of concern on areas examined.     Past Medical History:   Past Medical History:   Diagnosis Date     Basal cell carcinoma      Complication of anesthesia     a long time ago during knee surgery. Patient was nauseous for 3 days after surgery     Other specified malignant neoplasm of skin of trunk, except scrotum      Prostate infection     prostatitis     Squamous cell carcinoma      Past Surgical History:   Procedure Laterality Date     ADENOIDECTOMY       BIOPSY OF SKIN LESION       LITHOTRIPSY       MOHS MICROGRAPHIC PROCEDURE       PROSTATE BIOPSY, NEEDLE, SATURATION SAMPLING       TESTICLE SURGERY       TONSILLECTOMY       VASECTOMY         Social History:   reports that he has never smoked. He has never used smokeless tobacco. He reports current alcohol use. He reports that he does not use drugs.    Family History:  Family History   Problem Relation Age of Onset     Cancer Mother         skin cancer     Cancer Father      Hypertension Father      Glaucoma Other      Skin Cancer No family hx of         no skin cancer       Medications:  Current Outpatient Medications   Medication Sig Dispense Refill     amLODIPine (NORVASC) 5 MG tablet Take 5 mg by mouth.       aspirin 81 MG tablet Take 81 mg by mouth       atorvastatin (LIPITOR) 40 MG tablet Take 40 mg by mouth daily.       ferrous sulfate (FEROSUL) 325 (65 Fe) MG tablet Take 325 mg by mouth three times a week.       lisinopril (ZESTRIL) 40 MG tablet Take 40 mg by mouth daily       Multiple Vitamin (MULTIVITAMIN  S) CAPS Take 1 capsule by mouth       ketoconazole (NIZORAL) 2 % external cream Apply topically daily 60 g 3     Allergies   Allergen Reactions     Penicillins Anaphylaxis     Erythromycin Rash     Sulfa Antibiotics Hives and Rash               Again, thank you for allowing me to participate in the  care of your patient.      Sincerely,    Kunal Marie MD

## 2025-07-12 ENCOUNTER — HEALTH MAINTENANCE LETTER (OUTPATIENT)
Age: 75
End: 2025-07-12

## 2025-07-15 ENCOUNTER — MYC MEDICAL ADVICE (OUTPATIENT)
Dept: DERMATOLOGY | Facility: CLINIC | Age: 75
End: 2025-07-15
Payer: COMMERCIAL

## 2025-07-15 ENCOUNTER — TELEPHONE (OUTPATIENT)
Dept: DERMATOLOGY | Facility: CLINIC | Age: 75
End: 2025-07-15
Payer: COMMERCIAL

## 2025-07-15 NOTE — TELEPHONE ENCOUNTER
[facility] Call Center    Phone Message    May a detailed message be left on voicemail: abroad so MyChart is preferred.    Reason for Call: Other: [place] is overseas now but would like to be worked in for a spot check appointment for an area that was being watched and two new spots. [place]. Please send MyChart instead of calling.       Action Taken: Message routed to:  [facility] Derm    Travel Screening: Not Applicable

## (undated) RX ORDER — LIDOCAINE HYDROCHLORIDE AND EPINEPHRINE 10; 10 MG/ML; UG/ML
INJECTION, SOLUTION INFILTRATION; PERINEURAL
Status: DISPENSED
Start: 2022-11-30